# Patient Record
Sex: FEMALE | Race: WHITE | URBAN - METROPOLITAN AREA
[De-identification: names, ages, dates, MRNs, and addresses within clinical notes are randomized per-mention and may not be internally consistent; named-entity substitution may affect disease eponyms.]

---

## 2017-01-23 ENCOUNTER — MED REC SCAN ONLY (OUTPATIENT)
Dept: FAMILY MEDICINE CLINIC | Facility: CLINIC | Age: 77
End: 2017-01-23

## 2017-02-06 ENCOUNTER — OFFICE VISIT (OUTPATIENT)
Dept: FAMILY MEDICINE CLINIC | Facility: CLINIC | Age: 77
End: 2017-02-06

## 2017-02-06 VITALS
TEMPERATURE: 100 F | SYSTOLIC BLOOD PRESSURE: 130 MMHG | HEART RATE: 74 BPM | RESPIRATION RATE: 16 BRPM | DIASTOLIC BLOOD PRESSURE: 78 MMHG

## 2017-02-06 DIAGNOSIS — H25.013 CORTICAL AGE-RELATED CATARACT OF BOTH EYES: Primary | ICD-10-CM

## 2017-02-06 DIAGNOSIS — J44.9 CHRONIC OBSTRUCTIVE PULMONARY DISEASE, UNSPECIFIED COPD TYPE (HCC): ICD-10-CM

## 2017-02-06 DIAGNOSIS — Z01.818 PREOPERATIVE EXAMINATION: ICD-10-CM

## 2017-02-06 PROBLEM — E66.9 OBESITY: Status: ACTIVE | Noted: 2017-02-06

## 2017-02-06 PROBLEM — F17.200 TOBACCO USE DISORDER: Status: ACTIVE | Noted: 2017-02-06

## 2017-02-06 PROCEDURE — 93000 ELECTROCARDIOGRAM COMPLETE: CPT | Performed by: FAMILY MEDICINE

## 2017-02-06 PROCEDURE — 99215 OFFICE O/P EST HI 40 MIN: CPT | Performed by: FAMILY MEDICINE

## 2017-02-06 RX ORDER — LEVOTHYROXINE SODIUM 0.03 MG/1
1 TABLET ORAL
COMMUNITY
Start: 2015-10-21 | End: 2017-04-24

## 2017-02-06 RX ORDER — BUDESONIDE AND FORMOTEROL FUMARATE DIHYDRATE 160; 4.5 UG/1; UG/1
2 AEROSOL RESPIRATORY (INHALATION) DAILY
COMMUNITY
Start: 2012-12-19 | End: 2017-10-23

## 2017-02-06 RX ORDER — ALBUTEROL SULFATE 90 UG/1
2 AEROSOL, METERED RESPIRATORY (INHALATION) 4 TIMES DAILY PRN
COMMUNITY
Start: 2014-09-16 | End: 2017-07-13

## 2017-02-06 RX ORDER — LISINOPRIL 10 MG/1
1 TABLET ORAL
COMMUNITY
Start: 2014-05-15 | End: 2017-07-21

## 2017-02-06 NOTE — H&P
[facility]  [facility]    HPI:   Eze Davies is a 68year old female with a hx of cataract, who presents for a pre-operative physical exam. Patient is to cataract surgerye , to by done by Dr. Dudley Stoddard at [facility] on 2/13/20 Throat:  Denies hearing loss,or disturbance. Denies sore throat  INTEGUMENTARY:  Denies rashes, itching.     CARDIOVASCULAR:   Denies chest pain, chest pressure, chest discomfort, palpitations, edema, dyspnea on exertion or at rest.  RESPIRATORY:  See hpi, deficit, normal gait, strength and tone, sensory intact, normal reflexes. PSYCH: normal     ASSESSMENT AND PLAN:   Lucille Day is a 68year old female, with a hx of cataract surgery    Medically stable and clear for surgery.               GURPREET LIZAMA

## 2017-04-24 RX ORDER — LEVOTHYROXINE SODIUM 0.03 MG/1
TABLET ORAL
Qty: 30 TABLET | Refills: 6 | Status: SHIPPED | OUTPATIENT
Start: 2017-04-24 | End: 2017-10-27

## 2017-07-21 RX ORDER — LISINOPRIL 10 MG/1
TABLET ORAL
Qty: 30 TABLET | Refills: 5 | Status: SHIPPED | OUTPATIENT
Start: 2017-07-21 | End: 2018-03-06

## 2017-10-23 ENCOUNTER — TELEPHONE (OUTPATIENT)
Dept: FAMILY MEDICINE CLINIC | Facility: CLINIC | Age: 77
End: 2017-10-23

## 2017-10-23 RX ORDER — INFLUENZA A VIRUSA/MICHIGAN/45/2015 X-275 (H1N1) ANTIGEN (FORMALDEHYDE INACTIVATED), INFLUENZA A VIRUS A/HONG KONG/4801/2014 X-263B (H3N2) ANTIGEN (FORMALDEHYDE INACTIVATED), AND INFLUENZA B VIRUS B/BRISBANE/60/2008 ANTIGEN (FORMALDEHYDE INACTIVATED) 60; 60; 60 UG/.5ML; UG/.5ML; UG/.5ML
INJECTION, SUSPENSION INTRAMUSCULAR
Refills: 0 | COMMUNITY
Start: 2017-09-11 | End: 2018-03-20 | Stop reason: ALTCHOICE

## 2017-10-23 RX ORDER — BUDESONIDE AND FORMOTEROL FUMARATE DIHYDRATE 160; 4.5 UG/1; UG/1
2 AEROSOL RESPIRATORY (INHALATION) DAILY
Qty: 1 INHALER | Refills: 0 | Status: SHIPPED | OUTPATIENT
Start: 2017-10-23 | End: 2018-01-11

## 2017-10-26 ENCOUNTER — TELEPHONE (OUTPATIENT)
Dept: FAMILY MEDICINE CLINIC | Facility: CLINIC | Age: 77
End: 2017-10-26

## 2017-10-26 DIAGNOSIS — Z23 NEED FOR VACCINATION: Primary | ICD-10-CM

## 2017-10-26 NOTE — TELEPHONE ENCOUNTER
Looks like pt had prevnar 13 in 2016. Pt can get Pneumovax 23 now as long as she did not go elsewhere to get this shot. Then pt should be up to date on all pneumonia vaccinations for her lifetime.

## 2017-10-26 NOTE — TELEPHONE ENCOUNTER
Appt made  Future Appointments  Date Time Provider Edel Glover   10/27/2017 10:00 AM [facility] NURSE [facility] [place]

## 2017-10-26 NOTE — TELEPHONE ENCOUNTER
Patient notified of the below recommendations expressed understanding and thanks. Please enter orders?

## 2017-10-27 ENCOUNTER — NURSE ONLY (OUTPATIENT)
Dept: FAMILY MEDICINE CLINIC | Facility: CLINIC | Age: 77
End: 2017-10-27

## 2017-10-27 DIAGNOSIS — Z23 NEED FOR VACCINATION: ICD-10-CM

## 2017-10-27 PROCEDURE — G0009 ADMIN PNEUMOCOCCAL VACCINE: HCPCS | Performed by: FAMILY MEDICINE

## 2017-10-27 PROCEDURE — 90732 PPSV23 VACC 2 YRS+ SUBQ/IM: CPT | Performed by: FAMILY MEDICINE

## 2017-10-27 RX ORDER — LEVOTHYROXINE SODIUM 0.03 MG/1
TABLET ORAL
Qty: 30 TABLET | Refills: 6 | Status: SHIPPED | OUTPATIENT
Start: 2017-10-27 | End: 2018-03-05

## 2017-10-27 NOTE — TELEPHONE ENCOUNTER
Future appt:     Your appointments     Date & Time Appointment Department [facility])    Oct 27, 2017 10:00 AM CDT [facility]        [place] [address]

## 2017-12-18 RX ORDER — LISINOPRIL 10 MG/1
TABLET ORAL
Qty: 30 TABLET | Refills: 3 | OUTPATIENT
Start: 2017-12-18

## 2017-12-18 NOTE — TELEPHONE ENCOUNTER
Future appt:    Last Appointment:  Visit date not found  No results found for: CHOLEST, HDL, LDL, TRIGLY, TRIG  No results found for: EAG, A1C  No results found for: T4F, TSH, TSHT4    No Follow-up on file. No future appointments.       Last ov with pcp

## 2018-01-09 RX ORDER — BUDESONIDE AND FORMOTEROL FUMARATE DIHYDRATE 160; 4.5 UG/1; UG/1
2 AEROSOL RESPIRATORY (INHALATION) DAILY
Qty: 10.2 INHALER | Refills: 0 | Status: CANCELLED | OUTPATIENT
Start: 2018-01-09

## 2018-01-09 NOTE — TELEPHONE ENCOUNTER
Future appt:  None  Last Appointment:  2/6/2017; No f/u recommended    No results found for: CHOLEST, HDL, LDL, TRIGLY, TRIG  No results found for: EAG, A1C  No results found for: T4F, TSH, TSHT4    Last Labs:  6/1/2016  Last RF:  7/17/2017    No Follow-up

## 2018-01-09 NOTE — TELEPHONE ENCOUNTER
Future appt:  None  Last Appointment:  2/6/2017; No f/u recommended    No results found for: CHOLEST, HDL, LDL, TRIGLY, TRIG  No results found for: EAG, A1C  No results found for: T4F, TSH, TSHT4    Last Labs:  6/1/2016  Last RF:  10/23/2017    No Follow-u

## 2018-01-11 ENCOUNTER — TELEPHONE (OUTPATIENT)
Dept: FAMILY MEDICINE CLINIC | Facility: CLINIC | Age: 78
End: 2018-01-11

## 2018-01-11 RX ORDER — BUDESONIDE AND FORMOTEROL FUMARATE DIHYDRATE 160; 4.5 UG/1; UG/1
2 AEROSOL RESPIRATORY (INHALATION) DAILY
Qty: 1 INHALER | Refills: 1 | Status: SHIPPED | OUTPATIENT
Start: 2018-01-11 | End: 2018-02-07

## 2018-01-11 NOTE — TELEPHONE ENCOUNTER
Future appt:    Last Appointment:  2/6/17 with Dr. Dorie West  No results found for: CHOLEST, HDL, LDL, TRIGLY, TRIG  No results found for: EAG, A1C  No results found for: T4F, TSH, TSHT4  No results found for: GLU, BUN, BUNCREA, CREATSERUM, ANIONGAP, GFR,

## 2018-01-11 NOTE — TELEPHONE ENCOUNTER
See other phone note dated today  Patient stopped in to the office and a refill note was started    Yasmine Knight, 01/11/18, 11:45 AM

## 2018-02-05 NOTE — TELEPHONE ENCOUNTER
Future appt:     Your appointments     Date & Time Appointment Department [facility])    Feb 07, 2018 11:00 AM CST MA Supervisit with Claire Nick MD 25 [facility], Charissa Horton ([place])        [place] [address],

## 2018-02-07 ENCOUNTER — TELEPHONE (OUTPATIENT)
Dept: FAMILY MEDICINE CLINIC | Facility: CLINIC | Age: 78
End: 2018-02-07

## 2018-02-07 ENCOUNTER — OFFICE VISIT (OUTPATIENT)
Dept: FAMILY MEDICINE CLINIC | Facility: CLINIC | Age: 78
End: 2018-02-07

## 2018-02-07 ENCOUNTER — APPOINTMENT (OUTPATIENT)
Dept: LAB | Age: 78
End: 2018-02-07
Attending: FAMILY MEDICINE
Payer: MEDICARE

## 2018-02-07 VITALS
TEMPERATURE: 99 F | DIASTOLIC BLOOD PRESSURE: 60 MMHG | OXYGEN SATURATION: 92 % | BODY MASS INDEX: 26.25 KG/M2 | WEIGHT: 150 LBS | RESPIRATION RATE: 16 BRPM | SYSTOLIC BLOOD PRESSURE: 130 MMHG | HEIGHT: 63.5 IN | HEART RATE: 64 BPM

## 2018-02-07 DIAGNOSIS — J43.1 PANLOBULAR EMPHYSEMA (HCC): ICD-10-CM

## 2018-02-07 DIAGNOSIS — E03.9 ACQUIRED HYPOTHYROIDISM: ICD-10-CM

## 2018-02-07 DIAGNOSIS — Z00.00 ENCOUNTER FOR ANNUAL HEALTH EXAMINATION: ICD-10-CM

## 2018-02-07 DIAGNOSIS — Z00.00 HEALTH CARE MAINTENANCE: Primary | ICD-10-CM

## 2018-02-07 DIAGNOSIS — I10 BENIGN ESSENTIAL HYPERTENSION: ICD-10-CM

## 2018-02-07 DIAGNOSIS — I10 ESSENTIAL HYPERTENSION: ICD-10-CM

## 2018-02-07 DIAGNOSIS — F17.200 TOBACCO USE DISORDER: ICD-10-CM

## 2018-02-07 LAB
ALBUMIN SERPL-MCNC: 3.9 G/DL (ref 3.5–4.8)
ALP LIVER SERPL-CCNC: 61 U/L (ref 55–142)
ALT SERPL-CCNC: 12 U/L (ref 14–54)
AST SERPL-CCNC: 16 U/L (ref 15–41)
BILIRUB SERPL-MCNC: 0.6 MG/DL (ref 0.1–2)
BUN BLD-MCNC: 14 MG/DL (ref 8–20)
CALCIUM BLD-MCNC: 9.6 MG/DL (ref 8.3–10.3)
CHLORIDE: 107 MMOL/L (ref 101–111)
CO2: 28 MMOL/L (ref 22–32)
CREAT BLD-MCNC: 0.92 MG/DL (ref 0.55–1.02)
GLUCOSE BLD-MCNC: 79 MG/DL (ref 70–99)
M PROTEIN MFR SERPL ELPH: 7.2 G/DL (ref 6.1–8.3)
POTASSIUM SERPL-SCNC: 4.3 MMOL/L (ref 3.6–5.1)
SODIUM SERPL-SCNC: 143 MMOL/L (ref 136–144)
TSI SER-ACNC: 2.65 MIU/ML (ref 0.35–5.5)

## 2018-02-07 PROCEDURE — 36415 COLL VENOUS BLD VENIPUNCTURE: CPT | Performed by: FAMILY MEDICINE

## 2018-02-07 PROCEDURE — G0439 PPPS, SUBSEQ VISIT: HCPCS | Performed by: FAMILY MEDICINE

## 2018-02-07 PROCEDURE — 80053 COMPREHEN METABOLIC PANEL: CPT | Performed by: FAMILY MEDICINE

## 2018-02-07 PROCEDURE — 84443 ASSAY THYROID STIM HORMONE: CPT | Performed by: FAMILY MEDICINE

## 2018-02-07 PROCEDURE — 99397 PER PM REEVAL EST PAT 65+ YR: CPT | Performed by: FAMILY MEDICINE

## 2018-02-07 PROCEDURE — 96160 PT-FOCUSED HLTH RISK ASSMT: CPT | Performed by: FAMILY MEDICINE

## 2018-02-07 RX ORDER — ALBUTEROL SULFATE 90 UG/1
AEROSOL, METERED RESPIRATORY (INHALATION)
Qty: 3 INHALER | Refills: 3 | Status: SHIPPED | OUTPATIENT
Start: 2018-02-07 | End: 2018-11-01

## 2018-02-07 RX ORDER — BUDESONIDE AND FORMOTEROL FUMARATE DIHYDRATE 160; 4.5 UG/1; UG/1
2 AEROSOL RESPIRATORY (INHALATION) DAILY
Qty: 1 INHALER | Refills: 3 | Status: SHIPPED | OUTPATIENT
Start: 2018-02-07 | End: 2018-10-08

## 2018-02-07 NOTE — PROGRESS NOTES
Chief Complaint:   Patient presents with:  Physical      HPI:   This is a 68year old female coming in for healthcare check including underlying chronic illnesses which include COPD and hypertension.     COPD: Patient feels this is very stable recently she MOUTH EVERY MORNING Disp: 30 tablet Rfl: 5   Calcium Carbonate-Vitamin D (CALCIUM 500/VITAMIN D) 500-125 MG-UNIT Oral Tab Take 1 tablet by mouth daily.  Disp:  Rfl:    Tiotropium Bromide Monohydrate (SPIRIVA HANDIHALER) 18 MCG Inhalation Cap Inhale 1 capsul good dentition. NECK: Supple,  no JVD, no thyromegaly. SKIN: No rashes, no skin lesion, no bruising, good turgor. BREAST : deferred     HEART:  Regular rate and rhythm, no murmurs,   LUNGS: Clear to auscultation bilterally, no rales/rhonchi/wheezing. to call with any side effects or complications from the treatments as a result of today.      Problem List:  Patient Active Problem List:     [place] lesion     Panlobular emphysema ([facility] [address]     Essential hypertension     Hypoxemia     Obesity     Tobacco use dis

## 2018-02-08 NOTE — TELEPHONE ENCOUNTER
----- Message from Ciera Gaitan MD sent at 2/7/2018  6:17 PM CST -----  Labs reviewed   Thyroid testing good - continue with current medication dosing     BS, kidney fjunction and LFTs - normal

## 2018-02-28 ENCOUNTER — TELEPHONE (OUTPATIENT)
Dept: FAMILY MEDICINE CLINIC | Facility: CLINIC | Age: 78
End: 2018-02-28

## 2018-02-28 NOTE — TELEPHONE ENCOUNTER
Spoke w/ patient and informed her that inhalers were sent to pharmacy on 2/7/18. Pt states she was waiting for call back from us to let her know that they were filled. No further questions.

## 2018-02-28 NOTE — TELEPHONE ENCOUNTER
Called a couple weeks ago requesting Proair and Symbicort. Has not heard back. Please call her back today.

## 2018-03-05 RX ORDER — LEVOTHYROXINE SODIUM 0.03 MG/1
TABLET ORAL
Qty: 30 TABLET | Refills: 6 | Status: SHIPPED | OUTPATIENT
Start: 2018-03-05 | End: 2018-03-20 | Stop reason: DRUGHIGH

## 2018-03-05 NOTE — TELEPHONE ENCOUNTER
Levothyroxine 25 mcg daily  Last RF 10-27-17    Future appt:    Last Appointment:  2/7/2018 (physical - recheck in 1 year)  No results found for: CHOLEST, HDL, LDL, TRIGLY, TRIG  No results found for: EAG, A1C    Lab Results  Component Value Date   TSH 2.6

## 2018-03-06 RX ORDER — LISINOPRIL 10 MG/1
TABLET ORAL
Qty: 30 TABLET | Refills: 5 | Status: SHIPPED | OUTPATIENT
Start: 2018-03-06 | End: 2018-03-20

## 2018-03-06 NOTE — TELEPHONE ENCOUNTER
Lisinopril 10 mg tab daily  Last RF 7/21/17    Future appt:    Last Appointment:  2/7/2018 (physical - recheck in 1 year)    No results found for: CHOLEST, HDL, LDL, TRIGLY, TRIG  No results found for: EAG, A1C    Lab Results  Component Value Date   TSH 2.650 02/07/2018       No Follow-up on file.

## 2018-03-20 ENCOUNTER — OFFICE VISIT (OUTPATIENT)
Dept: FAMILY MEDICINE CLINIC | Facility: CLINIC | Age: 78
End: 2018-03-20

## 2018-03-20 VITALS
TEMPERATURE: 98 F | OXYGEN SATURATION: 91 % | WEIGHT: 149 LBS | DIASTOLIC BLOOD PRESSURE: 70 MMHG | RESPIRATION RATE: 18 BRPM | SYSTOLIC BLOOD PRESSURE: 146 MMHG | HEIGHT: 63.5 IN | BODY MASS INDEX: 26.08 KG/M2 | HEART RATE: 74 BPM

## 2018-03-20 DIAGNOSIS — R04.0 EPISTAXIS, RECURRENT: ICD-10-CM

## 2018-03-20 DIAGNOSIS — I16.0 HYPERTENSIVE URGENCY: Primary | ICD-10-CM

## 2018-03-20 PROCEDURE — 99214 OFFICE O/P EST MOD 30 MIN: CPT | Performed by: FAMILY MEDICINE

## 2018-03-20 PROCEDURE — 1111F DSCHRG MED/CURRENT MED MERGE: CPT | Performed by: FAMILY MEDICINE

## 2018-03-20 RX ORDER — LISINOPRIL 20 MG/1
20 TABLET ORAL DAILY
Qty: 90 TABLET | Refills: 3 | Status: SHIPPED | OUTPATIENT
Start: 2018-03-20 | End: 2018-08-31

## 2018-03-20 RX ORDER — LEVOTHYROXINE SODIUM 175 UG/1
1 TABLET ORAL DAILY
COMMUNITY
End: 2018-04-02

## 2018-03-20 NOTE — PROGRESS NOTES
Chief Complaint:   Patient presents with:  Hospital F/U: Bloody noses      HPI:   This is a 68year old female coming in for f/u of hypertensive urgency. Having nose bleeds. - seen in ED . No further nose bleeds. No headaches.        Brought readings edema,  RESPIRATORY:  Denies shortness of breath, wheezing, cough or sputum production. GASTROINTESTINAL:  Denies abdominal pain, nausea, vomiting, constipation, diarrhea  : denies dysuria, no urinary frequency , no discharge.     MUSCULOSKELETAL:  Deepa Porter noted.    ASSESSMENT AND PLAN:   1. Hypertensive urgency      2. Epistaxis, recurrent          Meds & Refills for this Visit:  Signed Prescriptions Disp Refills    lisinopril 20 MG Oral Tab 90 tablet 3      Sig: Take 1 tablet (20 mg total) by mouth daily.

## 2018-04-02 ENCOUNTER — OFFICE VISIT (OUTPATIENT)
Dept: FAMILY MEDICINE CLINIC | Facility: CLINIC | Age: 78
End: 2018-04-02

## 2018-04-02 ENCOUNTER — LAB ENCOUNTER (OUTPATIENT)
Dept: LAB | Age: 78
End: 2018-04-02
Attending: NURSE PRACTITIONER
Payer: MEDICARE

## 2018-04-02 VITALS
HEART RATE: 76 BPM | BODY MASS INDEX: 26 KG/M2 | RESPIRATION RATE: 14 BRPM | TEMPERATURE: 99 F | WEIGHT: 150 LBS | SYSTOLIC BLOOD PRESSURE: 112 MMHG | DIASTOLIC BLOOD PRESSURE: 78 MMHG

## 2018-04-02 DIAGNOSIS — N93.9 ABNORMAL VAGINAL BLEEDING: Primary | ICD-10-CM

## 2018-04-02 DIAGNOSIS — N93.9 ABNORMAL VAGINAL BLEEDING: ICD-10-CM

## 2018-04-02 PROCEDURE — 85025 COMPLETE CBC W/AUTO DIFF WBC: CPT | Performed by: NURSE PRACTITIONER

## 2018-04-02 PROCEDURE — 83002 ASSAY OF GONADOTROPIN (LH): CPT | Performed by: NURSE PRACTITIONER

## 2018-04-02 PROCEDURE — 36415 COLL VENOUS BLD VENIPUNCTURE: CPT | Performed by: NURSE PRACTITIONER

## 2018-04-02 PROCEDURE — 83001 ASSAY OF GONADOTROPIN (FSH): CPT | Performed by: NURSE PRACTITIONER

## 2018-04-02 PROCEDURE — 82671 ASSAY OF ESTROGENS: CPT | Performed by: NURSE PRACTITIONER

## 2018-04-02 PROCEDURE — 80053 COMPREHEN METABOLIC PANEL: CPT | Performed by: NURSE PRACTITIONER

## 2018-04-02 PROCEDURE — 84144 ASSAY OF PROGESTERONE: CPT | Performed by: NURSE PRACTITIONER

## 2018-04-02 PROCEDURE — 99214 OFFICE O/P EST MOD 30 MIN: CPT | Performed by: NURSE PRACTITIONER

## 2018-04-02 RX ORDER — LEVOTHYROXINE SODIUM 0.03 MG/1
1 TABLET ORAL DAILY
COMMUNITY
Start: 2018-03-26 | End: 2018-11-30

## 2018-04-02 NOTE — PATIENT INSTRUCTIONS
Blood work today  Transvaginal and transabdominal ultrasound ordered, please schedule here at this site either Tuesday or Thursday. Return with PCP if symptoms worsen.

## 2018-04-02 NOTE — PROGRESS NOTES
[address] NOTE  Jayne Lau is a 68year old female. Chief Complaint:  Patient presents with:   Other: rectal bleeding noticed this morning    HPI:   Patient presents to office visit with complaint of nickel-sized brigh History:  Family History   Problem Relation Age of Onset   • Diabetes Sister      Allergies:  No Known Allergies  Current Meds:    Current Outpatient Prescriptions:  Levothyroxine Sodium 25 MCG Oral Tab Take 1 tablet by mouth daily.  Disp:  Rfl:    lisinopr tenderness, no G/R  GYNE/: normal external genitalia; no vaginal discharge, mild red blood coming from os of cervix, no polyps or lesions in vaginal vault  RECTAL: no rectal masses; stool heme negative  EXTREMITIES: no cyanosis, clubbing or edema  Psych:

## 2018-04-03 ENCOUNTER — TELEPHONE (OUTPATIENT)
Dept: FAMILY MEDICINE CLINIC | Facility: CLINIC | Age: 78
End: 2018-04-03

## 2018-04-03 NOTE — TELEPHONE ENCOUNTER
Patient notified per [place] Course, APN  When patient has ultrasound appt needs to come in with full bladder. Patient expressed understanding and thanks.

## 2018-04-03 NOTE — TELEPHONE ENCOUNTER
Per request from ultrasound, remind patient to come in with a full bladder for pelvis ultrasound today.

## 2018-04-07 ENCOUNTER — TELEPHONE (OUTPATIENT)
Dept: FAMILY MEDICINE CLINIC | Facility: CLINIC | Age: 78
End: 2018-04-07

## 2018-04-07 NOTE — TELEPHONE ENCOUNTER
----- Message from BAKARI Wood sent at 4/7/2018  7:42 AM CDT -----  Patient was seen by ABBY Messina. Please let her know that her estrogen levels are normal. Thank you.  ABBY Wood, FNP-BC  4/7/2018  7:42 AM

## 2018-04-10 ENCOUNTER — HOSPITAL ENCOUNTER (OUTPATIENT)
Dept: ULTRASOUND IMAGING | Age: 78
Discharge: HOME | End: 2018-04-10
Attending: NURSE PRACTITIONER
Payer: MEDICARE

## 2018-04-10 DIAGNOSIS — N93.9 ABNORMAL VAGINAL BLEEDING: ICD-10-CM

## 2018-04-10 PROCEDURE — 76830 TRANSVAGINAL US NON-OB: CPT | Performed by: NURSE PRACTITIONER

## 2018-04-10 PROCEDURE — 76856 US EXAM PELVIC COMPLETE: CPT | Performed by: NURSE PRACTITIONER

## 2018-04-13 ENCOUNTER — OFFICE VISIT (OUTPATIENT)
Dept: FAMILY MEDICINE CLINIC | Facility: CLINIC | Age: 78
End: 2018-04-13

## 2018-04-13 VITALS
TEMPERATURE: 98 F | RESPIRATION RATE: 18 BRPM | HEART RATE: 72 BPM | HEIGHT: 63.5 IN | OXYGEN SATURATION: 91 % | DIASTOLIC BLOOD PRESSURE: 74 MMHG | SYSTOLIC BLOOD PRESSURE: 140 MMHG | WEIGHT: 152.38 LBS | BODY MASS INDEX: 26.67 KG/M2

## 2018-04-13 DIAGNOSIS — N84.0 UTERINE POLYP: ICD-10-CM

## 2018-04-13 DIAGNOSIS — N93.8 DYSFUNCTIONAL UTERINE BLEEDING: Primary | ICD-10-CM

## 2018-04-13 PROCEDURE — 99215 OFFICE O/P EST HI 40 MIN: CPT | Performed by: FAMILY MEDICINE

## 2018-04-13 RX ORDER — MEDROXYPROGESTERONE ACETATE 10 MG/1
10 TABLET ORAL DAILY
Qty: 10 TABLET | Refills: 0 | Status: SHIPPED | OUTPATIENT
Start: 2018-04-13 | End: 2018-04-23

## 2018-04-13 NOTE — PROGRESS NOTES
Chief Complaint:   Patient presents with: Follow - Up: 6 week f/u      HPI:   This is a 68year old female coming in for follow-up care regarding vaginal bleeding regard patient was followed up with the prior evaluation with Deysi Mcfarland.   Had an ultrasound %   RDW-SD 48.3 (H) 35.1 - 46.3 fL   Neutrophil Absolute Prelim 5.30 1.30 - 6.70 x10 (3) uL   Neutrophil Absolute 5.30 1.30 - 6.70 x10(3) uL   Lymphocyte Absolute 1.15 0.90 - 4.00 x10(3) uL   Monocyte Absolute 0.51 0.10 - 1.00 x10(3) uL   Eosinophil Absolu daily. Disp:  Rfl:         Allergies:  No Known Allergies       REVIEW OF SYSTEMS:   CONSTITUTIONAL:  Denies , fever, chills, or fatigue,     EENT:  Eyes:  Denies eye pain, visual changes,   Ears, Nose, Throat:  Denies hearing loss,or disturbance.  Denies s pulses bilaterally. NEURO:  No deficit, normal gait, strength and tone, sensory intact,      PSYCH: no depression or anxiety noted. ASSESSMENT AND PLAN:   1. Dysfunctional uterine bleeding  2.  Uterine polyp  Discussed with patient potential need for re

## 2018-04-13 NOTE — PATIENT INSTRUCTIONS
Start medication daily for 10 days. NOTE:  May have bleeding after medicine stops. Recheck with me in 4 -6 weeks.

## 2018-05-18 ENCOUNTER — TELEPHONE (OUTPATIENT)
Dept: FAMILY MEDICINE CLINIC | Facility: CLINIC | Age: 78
End: 2018-05-18

## 2018-05-18 NOTE — TELEPHONE ENCOUNTER
Jarred Corona came in for her appointment today. Her appointment was at 10am but she showed up @ 10:30am. She said that Dao said her appointment was at 10:30. She was told that she needs to reschedule because she was late.     Per Ewelina to no show her but

## 2018-05-21 ENCOUNTER — OFFICE VISIT (OUTPATIENT)
Dept: FAMILY MEDICINE CLINIC | Facility: CLINIC | Age: 78
End: 2018-05-21

## 2018-05-21 VITALS
SYSTOLIC BLOOD PRESSURE: 152 MMHG | HEIGHT: 63.5 IN | BODY MASS INDEX: 26.63 KG/M2 | RESPIRATION RATE: 16 BRPM | HEART RATE: 62 BPM | WEIGHT: 152.19 LBS | TEMPERATURE: 98 F | DIASTOLIC BLOOD PRESSURE: 78 MMHG

## 2018-05-21 DIAGNOSIS — I10 ESSENTIAL HYPERTENSION: ICD-10-CM

## 2018-05-21 DIAGNOSIS — N84.0 UTERINE POLYP: Primary | ICD-10-CM

## 2018-05-21 DIAGNOSIS — N93.9 ABNORMAL VAGINAL BLEEDING: ICD-10-CM

## 2018-05-21 DIAGNOSIS — J43.1 PANLOBULAR EMPHYSEMA (HCC): ICD-10-CM

## 2018-05-21 PROCEDURE — 99214 OFFICE O/P EST MOD 30 MIN: CPT | Performed by: FAMILY MEDICINE

## 2018-05-21 NOTE — PROGRESS NOTES
Chief Complaint:   Patient presents with: Follow - Up: spotting      HPI:   This is a 68year old female coming in for f/u of utrerine polyp and spotting. Patient with a lot of stresses recently with sister in hospice. Sister now doing better.      No furt Neutrophil % 74.3 %   Lymphocyte % 16.1 %   Monocyte % 7.1 %   Eosinophil % 1.8 %   Basophil % 0.4 %   Immature Granulocyte % 0.3 %         Past Medical History (reviewed - no changes required):   Smoker - advised to quit  COPD/Emphysema - sees dr. Juanjose Lai nausea, vomiting, constipation, diarrhea  :see HPI    denies dysuria, no urinary frequency , no discharge.     MUSCULOSKELETAL:  Denies weakness, muscle aches,   NEUROLOGICAL:  Denies headache, dizziness,   HEMATOLOGIC:  Denies bleeding or bruising.    Education: Patient/Caregiver Education: There are no barriers to learning. Medical education done. Outcome: Patient verbalizes understanding. Patient is notified to call with any questions, complications, allergies, or worsening or changing symptoms.   Pa

## 2018-08-31 ENCOUNTER — OFFICE VISIT (OUTPATIENT)
Dept: FAMILY MEDICINE CLINIC | Facility: CLINIC | Age: 78
End: 2018-08-31
Payer: COMMERCIAL

## 2018-08-31 VITALS
RESPIRATION RATE: 20 BRPM | WEIGHT: 152 LBS | DIASTOLIC BLOOD PRESSURE: 68 MMHG | SYSTOLIC BLOOD PRESSURE: 172 MMHG | HEART RATE: 76 BPM | BODY MASS INDEX: 27 KG/M2 | TEMPERATURE: 97 F

## 2018-08-31 DIAGNOSIS — F17.200 TOBACCO USE DISORDER: ICD-10-CM

## 2018-08-31 DIAGNOSIS — I10 ESSENTIAL HYPERTENSION: Primary | ICD-10-CM

## 2018-08-31 PROCEDURE — 99214 OFFICE O/P EST MOD 30 MIN: CPT | Performed by: FAMILY MEDICINE

## 2018-08-31 RX ORDER — LISINOPRIL 40 MG/1
40 TABLET ORAL DAILY
Qty: 90 TABLET | Refills: 3 | Status: SHIPPED | OUTPATIENT
Start: 2018-08-31 | End: 2019-08-15

## 2018-08-31 NOTE — PATIENT INSTRUCTIONS
Increase dose of lisinopril to 40 mg daily     Recheck in 3 months.      Low salt diet ; drink plenty of water

## 2018-08-31 NOTE — PROGRESS NOTES
Chief Complaint:   Patient presents with:  Vaginal Bleeding: 3 month follow up      HPI:   This is a 68year old female coming in for hypertension follow-up. Patient has noticed some increase in blood pressures recently.   She notes a lot of that she feels  Eyes:  Denies eye pain, visual changes,   Ears, Nose, Throat:  Denies hearing loss,or disturbance.  Denies sore throat  INTEGUMENTARY:  Denies rashes, itching.     CARDIOVASCULAR: Denies  chest discomfort, palpitations, edema,  RESPIRATORY:  Denies shortne hypertension      2. Tobacco use disorder          Meds & Refills for this Visit:  Signed Prescriptions Disp Refills    lisinopril 40 MG Oral Tab 90 tablet 3      Sig: Take 1 tablet (40 mg total) by mouth daily.            Patient Instructions   Increase do

## 2018-09-17 RX ORDER — LEVOTHYROXINE SODIUM 0.03 MG/1
TABLET ORAL
Qty: 30 TABLET | Refills: 1 | Status: SHIPPED | OUTPATIENT
Start: 2018-09-17 | End: 2018-10-06

## 2018-09-17 NOTE — TELEPHONE ENCOUNTER
Future appt:     Your appointments     Date & Time Appointment Department [facility])    Nov 30, 2018 10:00 AM CST Follow up - Extended with Jenaro Jacobsen MD 25 [facility], [place] ([facility])        [facility]

## 2018-10-06 ENCOUNTER — OFFICE VISIT (OUTPATIENT)
Dept: FAMILY MEDICINE CLINIC | Facility: CLINIC | Age: 78
End: 2018-10-06
Payer: COMMERCIAL

## 2018-10-06 VITALS
OXYGEN SATURATION: 93 % | SYSTOLIC BLOOD PRESSURE: 138 MMHG | HEART RATE: 70 BPM | DIASTOLIC BLOOD PRESSURE: 80 MMHG | RESPIRATION RATE: 18 BRPM | TEMPERATURE: 98 F | HEIGHT: 63.5 IN | WEIGHT: 150.19 LBS | BODY MASS INDEX: 26.28 KG/M2

## 2018-10-06 DIAGNOSIS — I10 ESSENTIAL HYPERTENSION: Primary | ICD-10-CM

## 2018-10-06 DIAGNOSIS — J43.1 PANLOBULAR EMPHYSEMA (HCC): ICD-10-CM

## 2018-10-06 PROCEDURE — 99214 OFFICE O/P EST MOD 30 MIN: CPT | Performed by: FAMILY MEDICINE

## 2018-10-06 RX ORDER — HYDROCHLOROTHIAZIDE 25 MG/1
25 TABLET ORAL DAILY
Qty: 90 TABLET | Refills: 3 | Status: SHIPPED | OUTPATIENT
Start: 2018-10-06 | End: 2019-09-17

## 2018-10-06 NOTE — PATIENT INSTRUCTIONS
Start new blood pressure medication - HCTZ - take once daily with lisinopril    Continue with taking lisinopril     Continue with taking blood pressure readings. Recheck appointment with me in 6 weeks.

## 2018-10-06 NOTE — PROGRESS NOTES
Chief Complaint:   Patient presents with:  Medication Follow-Up: Patient would like to talk about increasing blood pressure medication      HPI:   This is a 66year old female coming in for follow-up care regarding hypertension.   Patient's been checking he 5. 30 1.30 - 6.70 x10 (3) uL    Neutrophil Absolute 5.30 1.30 - 6.70 x10(3) uL    Lymphocyte Absolute 1.15 0.90 - 4.00 x10(3) uL    Monocyte Absolute 0.51 0.10 - 1.00 x10(3) uL    Eosinophil Absolute 0.13 0.00 - 0.30 x10(3) uL    Basophil Absolute 0.03 0.00 mouth daily. Disp:  Rfl:    Tiotropium Bromide Monohydrate (SPIRIVA HANDIHALER) 18 MCG Inhalation Cap Inhale 1 capsule into the lungs daily.  Disp:  Rfl:         Allergies:  No Known Allergies       REVIEW OF SYSTEMS:   CONSTITUTIONAL:  Denies , fever, chil hepatosplenomegaly.     BACK: No tenderness, no spasm, SLR test negative, FROM. EXTREMITIES:  No edema, no cyanosis,FROM, 2+ dorsalis pedis pulses bilaterally.   NEURO:  No deficit, normal gait, strength and tone, sensory intact,      PSYCH: no depression

## 2018-10-08 NOTE — TELEPHONE ENCOUNTER
Please advise refill of symbicort. Future appt:     Your appointments     Date & Time Appointment Department [facility])    Nov 30, 2018 10:00 AM CST Follow up - Extended with Sam Amezcua MD 25 [facility], [facility] ([facility]

## 2018-10-09 RX ORDER — BUDESONIDE AND FORMOTEROL FUMARATE DIHYDRATE 160; 4.5 UG/1; UG/1
AEROSOL RESPIRATORY (INHALATION)
Qty: 10.2 INHALER | Refills: 3 | Status: SHIPPED | OUTPATIENT
Start: 2018-10-09 | End: 2019-01-01

## 2018-11-01 RX ORDER — ALBUTEROL SULFATE 90 UG/1
AEROSOL, METERED RESPIRATORY (INHALATION)
Qty: 25.5 INHALER | Refills: 3 | Status: SHIPPED | OUTPATIENT
Start: 2018-11-01 | End: 2019-09-14

## 2018-11-01 NOTE — TELEPHONE ENCOUNTER
Please advise refill of Proair Inhaler. Last Rx: 2/7/18    Future appt:     Your appointments     Date & Time Appointment Department [facility])    Nov 30, 2018 10:00 AM CST Follow up - Extended with Jael Reynolds MD [address] [place] [address]

## 2018-11-30 ENCOUNTER — OFFICE VISIT (OUTPATIENT)
Dept: FAMILY MEDICINE CLINIC | Facility: CLINIC | Age: 78
End: 2018-11-30
Payer: COMMERCIAL

## 2018-11-30 VITALS
DIASTOLIC BLOOD PRESSURE: 60 MMHG | HEIGHT: 63.5 IN | SYSTOLIC BLOOD PRESSURE: 126 MMHG | TEMPERATURE: 98 F | HEART RATE: 72 BPM | OXYGEN SATURATION: 90 % | BODY MASS INDEX: 25.83 KG/M2 | WEIGHT: 147.63 LBS | RESPIRATION RATE: 16 BRPM

## 2018-11-30 DIAGNOSIS — J43.1 PANLOBULAR EMPHYSEMA (HCC): ICD-10-CM

## 2018-11-30 DIAGNOSIS — I10 ESSENTIAL HYPERTENSION: Primary | ICD-10-CM

## 2018-11-30 PROCEDURE — 99214 OFFICE O/P EST MOD 30 MIN: CPT | Performed by: FAMILY MEDICINE

## 2018-11-30 RX ORDER — LEVOTHYROXINE SODIUM 0.03 MG/1
25 TABLET ORAL DAILY
Qty: 90 TABLET | Refills: 1 | Status: SHIPPED | OUTPATIENT
Start: 2018-11-30 | End: 2019-06-14

## 2018-11-30 RX ORDER — AMLODIPINE BESYLATE 2.5 MG/1
2.5 TABLET ORAL DAILY
Qty: 90 TABLET | Refills: 3 | Status: SHIPPED | OUTPATIENT
Start: 2018-11-30 | End: 2019-05-31

## 2018-11-30 NOTE — PROGRESS NOTES
Chief Complaint:   Patient presents with: Follow - Up: 3 month f/u      HPI:   This is a 66year old female coming in for follow-up care regarding hypertension. Patient takes her medication regularly.   She also checks her blood pressure her blood pressur 5. 30 1.30 - 6.70 x10 (3) uL    Neutrophil Absolute 5.30 1.30 - 6.70 x10(3) uL    Lymphocyte Absolute 1.15 0.90 - 4.00 x10(3) uL    Monocyte Absolute 0.51 0.10 - 1.00 x10(3) uL    Eosinophil Absolute 0.13 0.00 - 0.30 x10(3) uL    Basophil Absolute 0.03 0.00 lisinopril 40 MG Oral Tab Take 1 tablet (40 mg total) by mouth daily. Disp: 90 tablet Rfl: 3   Levothyroxine Sodium 25 MCG Oral Tab Take 1 tablet by mouth daily.  Disp:  Rfl:    Calcium Carbonate-Vitamin D (CALCIUM 500/VITAMIN D) 500-125 MG-UNIT Oral Tab thyromegaly.   SKIN: No rashes, no skin lesion, no bruising, good turgor.     HEART:  Regular rate and rhythm, no murmurs,   LUNGS: Clear to auscultation bilterally, no rales/rhonchi/wheezing.     ABDOMEN:  Soft, nondistended, nontender, bowel sounds normal Tobacco use disorder      Jose Alberto MD  11/30/2018  10:38 AM

## 2018-11-30 NOTE — PATIENT INSTRUCTIONS
Add amlodipine - take daily in the morning. Continue with lisinopril and HCTZ. Recheck in 3 months.  - physical .

## 2018-11-30 NOTE — TELEPHONE ENCOUNTER
Please advise refill of levothyroxine 25mcg. Future appt:     Your appointments     Date & Time Appointment Department [facility])    Mar 01, 2019 10:30 AM [facility] Supervisit with Billy Wetzel MD [address] [place] [address]

## 2019-01-01 RX ORDER — BUDESONIDE AND FORMOTEROL FUMARATE DIHYDRATE 160; 4.5 UG/1; UG/1
AEROSOL RESPIRATORY (INHALATION)
Qty: 20.4 INHALER | Refills: 1 | Status: SHIPPED | OUTPATIENT
Start: 2019-01-01 | End: 2020-01-01

## 2019-01-01 RX ORDER — LEVOTHYROXINE SODIUM 0.03 MG/1
TABLET ORAL
Qty: 90 TABLET | Refills: 0 | Status: SHIPPED | OUTPATIENT
Start: 2019-01-01 | End: 2020-01-01

## 2019-03-01 ENCOUNTER — LAB ENCOUNTER (OUTPATIENT)
Dept: LAB | Age: 79
End: 2019-03-01
Attending: FAMILY MEDICINE
Payer: MEDICARE

## 2019-03-01 ENCOUNTER — OFFICE VISIT (OUTPATIENT)
Dept: FAMILY MEDICINE CLINIC | Facility: CLINIC | Age: 79
End: 2019-03-01
Payer: COMMERCIAL

## 2019-03-01 VITALS
RESPIRATION RATE: 18 BRPM | HEART RATE: 70 BPM | HEIGHT: 63.5 IN | BODY MASS INDEX: 25.66 KG/M2 | DIASTOLIC BLOOD PRESSURE: 60 MMHG | OXYGEN SATURATION: 91 % | WEIGHT: 146.63 LBS | SYSTOLIC BLOOD PRESSURE: 124 MMHG | TEMPERATURE: 98 F

## 2019-03-01 DIAGNOSIS — I10 ESSENTIAL HYPERTENSION: ICD-10-CM

## 2019-03-01 DIAGNOSIS — Z00.00 ENCOUNTER FOR ANNUAL HEALTH EXAMINATION: Primary | ICD-10-CM

## 2019-03-01 DIAGNOSIS — J43.1 PANLOBULAR EMPHYSEMA (HCC): ICD-10-CM

## 2019-03-01 DIAGNOSIS — F17.200 TOBACCO USE DISORDER: ICD-10-CM

## 2019-03-01 LAB
ALBUMIN SERPL-MCNC: 3.6 G/DL (ref 3.4–5)
ALBUMIN/GLOB SERPL: 1 {RATIO} (ref 1–2)
ALP LIVER SERPL-CCNC: 50 U/L (ref 55–142)
ALT SERPL-CCNC: 17 U/L (ref 13–56)
ANION GAP SERPL CALC-SCNC: 7 MMOL/L (ref 0–18)
AST SERPL-CCNC: 19 U/L (ref 15–37)
BASOPHILS # BLD AUTO: 0.03 X10(3) UL (ref 0–0.2)
BASOPHILS NFR BLD AUTO: 0.5 %
BILIRUB SERPL-MCNC: 0.4 MG/DL (ref 0.1–2)
BILIRUB UR QL STRIP.AUTO: NEGATIVE
BUN BLD-MCNC: 13 MG/DL (ref 7–18)
BUN/CREAT SERPL: 12 (ref 10–20)
CALCIUM BLD-MCNC: 9.4 MG/DL (ref 8.5–10.1)
CHLORIDE SERPL-SCNC: 108 MMOL/L (ref 98–107)
CLARITY UR REFRACT.AUTO: CLEAR
CO2 SERPL-SCNC: 27 MMOL/L (ref 21–32)
COLOR UR AUTO: YELLOW
CREAT BLD-MCNC: 1.08 MG/DL (ref 0.55–1.02)
DEPRECATED RDW RBC AUTO: 45.8 FL (ref 35.1–46.3)
EOSINOPHIL # BLD AUTO: 0.2 X10(3) UL (ref 0–0.7)
EOSINOPHIL NFR BLD AUTO: 3.1 %
ERYTHROCYTE [DISTWIDTH] IN BLOOD BY AUTOMATED COUNT: 13.5 % (ref 11–15)
GLOBULIN PLAS-MCNC: 3.6 G/DL (ref 2.8–4.4)
GLUCOSE BLD-MCNC: 74 MG/DL (ref 70–99)
GLUCOSE UR STRIP.AUTO-MCNC: NEGATIVE MG/DL
HCT VFR BLD AUTO: 40 % (ref 35–48)
HGB BLD-MCNC: 12.9 G/DL (ref 12–16)
IMM GRANULOCYTES # BLD AUTO: 0.03 X10(3) UL (ref 0–1)
IMM GRANULOCYTES NFR BLD: 0.5 %
KETONES UR STRIP.AUTO-MCNC: NEGATIVE MG/DL
LEUKOCYTE ESTERASE UR QL STRIP.AUTO: NEGATIVE
LYMPHOCYTES # BLD AUTO: 1 X10(3) UL (ref 1–4)
LYMPHOCYTES NFR BLD AUTO: 15.3 %
M PROTEIN MFR SERPL ELPH: 7.2 G/DL (ref 6.4–8.2)
MCH RBC QN AUTO: 30.1 PG (ref 26–34)
MCHC RBC AUTO-ENTMCNC: 32.3 G/DL (ref 31–37)
MCV RBC AUTO: 93.2 FL (ref 80–100)
MONOCYTES # BLD AUTO: 0.51 X10(3) UL (ref 0.1–1)
MONOCYTES NFR BLD AUTO: 7.8 %
NEUTROPHILS # BLD AUTO: 4.76 X10 (3) UL (ref 1.5–7.7)
NEUTROPHILS # BLD AUTO: 4.76 X10(3) UL (ref 1.5–7.7)
NEUTROPHILS NFR BLD AUTO: 72.8 %
NITRITE UR QL STRIP.AUTO: NEGATIVE
OSMOLALITY SERPL CALC.SUM OF ELEC: 293 MOSM/KG (ref 275–295)
PH UR STRIP.AUTO: 6 [PH] (ref 4.5–8)
PLATELET # BLD AUTO: 277 10(3)UL (ref 150–450)
POTASSIUM SERPL-SCNC: 4 MMOL/L (ref 3.5–5.1)
PROT UR STRIP.AUTO-MCNC: NEGATIVE MG/DL
RBC # BLD AUTO: 4.29 X10(6)UL (ref 3.8–5.3)
RBC UR QL AUTO: NEGATIVE
SODIUM SERPL-SCNC: 142 MMOL/L (ref 136–145)
SP GR UR STRIP.AUTO: 1.01 (ref 1–1.03)
TSI SER-ACNC: 3 MIU/ML (ref 0.36–3.74)
UROBILINOGEN UR STRIP.AUTO-MCNC: <2 MG/DL
WBC # BLD AUTO: 6.5 X10(3) UL (ref 4–11)

## 2019-03-01 PROCEDURE — 36415 COLL VENOUS BLD VENIPUNCTURE: CPT

## 2019-03-01 PROCEDURE — 85025 COMPLETE CBC W/AUTO DIFF WBC: CPT

## 2019-03-01 PROCEDURE — 84443 ASSAY THYROID STIM HORMONE: CPT

## 2019-03-01 PROCEDURE — G0439 PPPS, SUBSEQ VISIT: HCPCS | Performed by: FAMILY MEDICINE

## 2019-03-01 PROCEDURE — 99397 PER PM REEVAL EST PAT 65+ YR: CPT | Performed by: FAMILY MEDICINE

## 2019-03-01 PROCEDURE — 80053 COMPREHEN METABOLIC PANEL: CPT

## 2019-03-01 PROCEDURE — 96160 PT-FOCUSED HLTH RISK ASSMT: CPT | Performed by: FAMILY MEDICINE

## 2019-03-01 PROCEDURE — 81003 URINALYSIS AUTO W/O SCOPE: CPT

## 2019-03-04 ENCOUNTER — TELEPHONE (OUTPATIENT)
Dept: FAMILY MEDICINE CLINIC | Facility: CLINIC | Age: 79
End: 2019-03-04

## 2019-03-04 NOTE — TELEPHONE ENCOUNTER
----- Message from Arash Oquendo MD sent at 3/4/2019  4:38 PM CST -----  Labs reviewed     Normal UA.     BS, kidney function , LFTs - normal     Thyroid testing good - continue with current dosing . CBc normal -     Recheck of labs in 1 year.

## 2019-03-19 ENCOUNTER — OFFICE VISIT (OUTPATIENT)
Dept: FAMILY MEDICINE CLINIC | Facility: CLINIC | Age: 79
End: 2019-03-19
Payer: COMMERCIAL

## 2019-03-19 ENCOUNTER — APPOINTMENT (OUTPATIENT)
Dept: LAB | Age: 79
End: 2019-03-19
Attending: NURSE PRACTITIONER
Payer: MEDICARE

## 2019-03-19 VITALS
SYSTOLIC BLOOD PRESSURE: 126 MMHG | DIASTOLIC BLOOD PRESSURE: 60 MMHG | BODY MASS INDEX: 26.22 KG/M2 | WEIGHT: 148 LBS | HEART RATE: 77 BPM | OXYGEN SATURATION: 96 % | TEMPERATURE: 99 F | HEIGHT: 63 IN

## 2019-03-19 DIAGNOSIS — E03.8 HYPOTHYROIDISM DUE TO HASHIMOTO'S THYROIDITIS: Primary | ICD-10-CM

## 2019-03-19 DIAGNOSIS — E06.3 HYPOTHYROIDISM DUE TO HASHIMOTO'S THYROIDITIS: Primary | ICD-10-CM

## 2019-03-19 LAB
T4 FREE SERPL-MCNC: 0.9 NG/DL (ref 0.8–1.7)
TSI SER-ACNC: 2.45 MIU/ML (ref 0.36–3.74)

## 2019-03-19 PROCEDURE — 99214 OFFICE O/P EST MOD 30 MIN: CPT | Performed by: NURSE PRACTITIONER

## 2019-03-19 PROCEDURE — 36415 COLL VENOUS BLD VENIPUNCTURE: CPT | Performed by: NURSE PRACTITIONER

## 2019-03-19 PROCEDURE — 84439 ASSAY OF FREE THYROXINE: CPT | Performed by: NURSE PRACTITIONER

## 2019-03-19 PROCEDURE — 84443 ASSAY THYROID STIM HORMONE: CPT | Performed by: NURSE PRACTITIONER

## 2019-03-19 NOTE — PATIENT INSTRUCTIONS
I would recommend keeping a food and drink log–and avoiding foods that cause diarrhea. We will check thyroid blood work today. Recommend that you use your albuterol inhaler first and then your Symbicort to help the Symbicort get in your lungs.     Rec

## 2019-03-20 ENCOUNTER — TELEPHONE (OUTPATIENT)
Dept: FAMILY MEDICINE CLINIC | Facility: CLINIC | Age: 79
End: 2019-03-20

## 2019-03-20 NOTE — TELEPHONE ENCOUNTER
----- Message from ABBY Grier sent at 3/20/2019 11:19 AM CDT -----  Please notify patient that her TSH and T4 are normal–her Synthroid dose is good it does not need to be adjusted.

## 2019-03-20 NOTE — TELEPHONE ENCOUNTER
Informed pt of her blood work results. Pt states she did not eat her hash browns today. Pt expressed understanding and thanks.

## 2019-05-31 ENCOUNTER — OFFICE VISIT (OUTPATIENT)
Dept: FAMILY MEDICINE CLINIC | Facility: CLINIC | Age: 79
End: 2019-05-31
Payer: COMMERCIAL

## 2019-05-31 ENCOUNTER — TELEPHONE (OUTPATIENT)
Dept: FAMILY MEDICINE CLINIC | Facility: CLINIC | Age: 79
End: 2019-05-31

## 2019-05-31 ENCOUNTER — HOSPITAL ENCOUNTER (OUTPATIENT)
Dept: GENERAL RADIOLOGY | Age: 79
Discharge: HOME | End: 2019-05-31
Attending: FAMILY MEDICINE
Payer: MEDICARE

## 2019-05-31 VITALS
OXYGEN SATURATION: 92 % | WEIGHT: 145.81 LBS | DIASTOLIC BLOOD PRESSURE: 64 MMHG | HEART RATE: 72 BPM | TEMPERATURE: 98 F | BODY MASS INDEX: 25.52 KG/M2 | HEIGHT: 63.5 IN | SYSTOLIC BLOOD PRESSURE: 138 MMHG | RESPIRATION RATE: 18 BRPM

## 2019-05-31 DIAGNOSIS — G89.29 ABDOMINAL PAIN, CHRONIC, GENERALIZED: ICD-10-CM

## 2019-05-31 DIAGNOSIS — R10.84 ABDOMINAL PAIN, CHRONIC, GENERALIZED: Primary | ICD-10-CM

## 2019-05-31 DIAGNOSIS — G89.29 ABDOMINAL PAIN, CHRONIC, GENERALIZED: Primary | ICD-10-CM

## 2019-05-31 DIAGNOSIS — R10.84 ABDOMINAL PAIN, CHRONIC, GENERALIZED: ICD-10-CM

## 2019-05-31 DIAGNOSIS — I10 BENIGN HYPERTENSION: ICD-10-CM

## 2019-05-31 PROCEDURE — 99214 OFFICE O/P EST MOD 30 MIN: CPT | Performed by: FAMILY MEDICINE

## 2019-05-31 PROCEDURE — 74019 RADEX ABDOMEN 2 VIEWS: CPT | Performed by: FAMILY MEDICINE

## 2019-05-31 RX ORDER — AMLODIPINE BESYLATE 2.5 MG/1
2.5 TABLET ORAL DAILY
Qty: 90 TABLET | Refills: 3 | Status: SHIPPED | OUTPATIENT
Start: 2019-05-31 | End: 2020-01-01

## 2019-05-31 NOTE — PATIENT INSTRUCTIONS
Obtain xray today     Decrease fats in breakfast     Ok for refills.    Recheck in 2 months for Appointmentl

## 2019-05-31 NOTE — TELEPHONE ENCOUNTER
----- Message from Tristan Valverde MD sent at 5/31/2019  4:11 PM CDT -----  Xray of bowels normal .

## 2019-06-02 NOTE — PROGRESS NOTES
Chief Complaint:   Patient presents with:  Diarrhea  Other: Tightness in chest that lasted about 30 minutes      HPI:   This is a 66year old female coming in for upset stomach   Off and on for several months.    Feels that it is related to certain foods - tablet (2.5 mg total) by mouth daily. Disp: 90 tablet Rfl: 3   Levothyroxine Sodium 25 MCG Oral Tab Take 1 tablet (25 mcg total) by mouth daily.  Disp: 90 tablet Rfl: 1   Albuterol Sulfate  (90 Base) MCG/ACT Inhalation Aero Soln INHALE 2 PUFFS FOUR T reviewed. Appears stated age, well groomed. Physical Exam:    GEN:  Patient is alert, awake and oriented, well developed, well nourished     , no apparent distress.   HEENT:  Head:  Normocephalic, atraumatic   Eyes: EOMI, PERRLA, no scleral icterus, conju

## 2019-06-14 RX ORDER — LEVOTHYROXINE SODIUM 0.03 MG/1
TABLET ORAL
Qty: 90 TABLET | Refills: 0 | Status: SHIPPED | OUTPATIENT
Start: 2019-06-14 | End: 2019-09-05

## 2019-06-14 NOTE — TELEPHONE ENCOUNTER
Future appt:     Your appointments     Date & Time Appointment Department [facility])    Jul 31, 2019  1:30 PM CDT Follow up with Ar Hampton MD [address] [place] [address] [facility],

## 2019-08-15 ENCOUNTER — OFFICE VISIT (OUTPATIENT)
Dept: FAMILY MEDICINE CLINIC | Facility: CLINIC | Age: 79
End: 2019-08-15
Payer: COMMERCIAL

## 2019-08-15 VITALS
WEIGHT: 145.63 LBS | BODY MASS INDEX: 25.49 KG/M2 | HEART RATE: 70 BPM | SYSTOLIC BLOOD PRESSURE: 132 MMHG | RESPIRATION RATE: 18 BRPM | HEIGHT: 63.5 IN | TEMPERATURE: 98 F | OXYGEN SATURATION: 93 % | DIASTOLIC BLOOD PRESSURE: 72 MMHG

## 2019-08-15 DIAGNOSIS — G89.29 ABDOMINAL PAIN, CHRONIC, GENERALIZED: Primary | ICD-10-CM

## 2019-08-15 DIAGNOSIS — R10.84 ABDOMINAL PAIN, CHRONIC, GENERALIZED: Primary | ICD-10-CM

## 2019-08-15 PROCEDURE — 99213 OFFICE O/P EST LOW 20 MIN: CPT | Performed by: FAMILY MEDICINE

## 2019-08-15 RX ORDER — LISINOPRIL 40 MG/1
TABLET ORAL
Qty: 90 TABLET | Refills: 3 | Status: SHIPPED | OUTPATIENT
Start: 2019-08-15 | End: 2020-01-01

## 2019-08-15 NOTE — TELEPHONE ENCOUNTER
Future appt:     Your appointments     Date & Time Appointment Department [facility])    Aug 15, 2019  1:30 PM CDT Follow up with Thanh Greene MD 25 [facility], [facility],

## 2019-08-16 PROBLEM — N18.30 CKD (CHRONIC KIDNEY DISEASE) STAGE 3, GFR 30-59 ML/MIN (HCC): Chronic | Status: ACTIVE | Noted: 2019-08-16

## 2019-08-17 NOTE — PROGRESS NOTES
Chief Complaint:   Patient presents with: Follow - Up: from abdominal pain appnt in may      HPI:   This is a 66year old female coming in for f/u care   Patient had been in with abdominal pain   Reviewed xray reports. abdoinal pain has resolved. 90 tablet Rfl: 3   Albuterol Sulfate  (90 Base) MCG/ACT Inhalation Aero Soln INHALE 2 PUFFS FOUR TIMES PER DAY AS NEEDED. Disp: 25.5 Inhaler Rfl: 3   SYMBICORT 160-4.5 MCG/ACT Inhalation Aerosol INHALE 2 PUFFS INTO THE LUNGS DAILY.  Disp: 10.2 Inhale nourished     , no apparent distress.   HEENT:  Head:  Normocephalic, atraumatic   Eyes: EOMI, PERRLA, no scleral icterus, conjunctivae clear bilaterally, no eye discharge   Ears: External normal. Nose: patent, no nasal discharge   Throat:  No tonsillar mandy

## 2019-09-05 NOTE — TELEPHONE ENCOUNTER
Future appt:    Last Appointment with provider:   Visit date not found  Last appointment at [facility] [place]:  8/15/2019  No results found for: CHOLEST, HDL, LDL, TRIGLY, TRIG  No results found for: EAG, A1C  Lab Results   Component Value Date    T4F 0.9 03/19

## 2019-09-09 ENCOUNTER — TELEPHONE (OUTPATIENT)
Dept: FAMILY MEDICINE CLINIC | Facility: CLINIC | Age: 79
End: 2019-09-09

## 2019-09-09 RX ORDER — LEVOTHYROXINE SODIUM 0.03 MG/1
TABLET ORAL
Qty: 90 TABLET | Refills: 0 | Status: SHIPPED | OUTPATIENT
Start: 2019-09-09 | End: 2019-01-01

## 2019-09-09 NOTE — TELEPHONE ENCOUNTER
Dr. Ez Patel is out of office today. One refill done. Please let patient know that she needs to be seen before more refills. Thank you.

## 2019-09-09 NOTE — TELEPHONE ENCOUNTER
Patient called again asking for her refill of levothyroxine to be sent to [facility] in [place]. Last TSH and T4 was 3/19/19. No future appointments. Alize can you please advise refill.      I will let patient know she is due for follow up visit prior to

## 2019-09-09 NOTE — TELEPHONE ENCOUNTER
Patient informed of the below. States she was just in to see Dr. Glenn Rosen and does not think she should be due to come back. I informed patient the recent appointments were for abdominal pain problem visits.      She is asking for Dr. Glenn Rosen to advise on when h

## 2019-09-09 NOTE — TELEPHONE ENCOUNTER
Patient needs refill on her Levothyroxine 25mcg 90 day supply to [facility] [place] 4

## 2019-09-14 ENCOUNTER — TELEPHONE (OUTPATIENT)
Dept: FAMILY MEDICINE CLINIC | Facility: CLINIC | Age: 79
End: 2019-09-14

## 2019-09-14 RX ORDER — ALBUTEROL SULFATE 90 UG/1
AEROSOL, METERED RESPIRATORY (INHALATION)
Qty: 25.5 INHALER | Refills: 3 | Status: SHIPPED | OUTPATIENT
Start: 2019-09-14 | End: 2020-01-01

## 2019-09-14 NOTE — TELEPHONE ENCOUNTER
Patient has already received both pneumonia vaccinations  - in 2016 and 2017 . She is up to date with pneumonia vaccines and does not need any further ones at this time. Patient should receive flu shot this fall.

## 2019-09-14 NOTE — TELEPHONE ENCOUNTER
Future appt:    Last Appointment with provider:   8/15/2019  Last appointment at [facility] [place]:  8/15/2019  No results found for: CHOLEST, HDL, LDL, TRIGLY, TRIG  No results found for: EAG, A1C  Lab Results   Component Value Date    T4F 0.9 03/19/2019    TS

## 2019-09-14 NOTE — TELEPHONE ENCOUNTER
Chart reviewed     [id number] Audrey Morejon for refill. Seen for health care check in March.      [id number] Audrey Morejon for physical in March 2020

## 2019-09-17 DIAGNOSIS — I10 ESSENTIAL HYPERTENSION: ICD-10-CM

## 2019-09-17 NOTE — TELEPHONE ENCOUNTER
Future appt:    Last Appointment with provider:   8/15/2019 f/u care; recheck physical in Feb, March  Last appointment at [facility] [place]:  8/15/2019  No results found for: CHOLEST, HDL, LDL, TRIGLY, TRIG  No results found for: EAG, A1C  Lab Results   Compone

## 2019-09-18 DIAGNOSIS — I10 ESSENTIAL HYPERTENSION: ICD-10-CM

## 2019-09-18 RX ORDER — HYDROCHLOROTHIAZIDE 25 MG/1
25 TABLET ORAL DAILY
Qty: 90 TABLET | Refills: 1 | Status: SHIPPED | OUTPATIENT
Start: 2019-09-18 | End: 2020-01-01

## 2019-09-20 RX ORDER — HYDROCHLOROTHIAZIDE 25 MG/1
TABLET ORAL
Qty: 90 TABLET | Refills: 3 | OUTPATIENT
Start: 2019-09-20

## 2019-09-20 NOTE — TELEPHONE ENCOUNTER
Received e-scribe refill request for hctz from [facility] in [place]. Hctz was refilled to [facility] in [place] for #90 with 1 refill on 9/18/19. Refill denied with the above notation.

## 2019-10-08 NOTE — PROGRESS NOTES
Blood pressure is fluctuating  I will monitored closely    Advised to monitor blood pressure at home also Janki Calixto is a 66year old female.   Patient presents with:  Diarrhea: x 3-4 months      HPI:   Complaints of diarrhea for 3-4 months   Is on levothyroxin 25 mcg   States she takes imodium prior to breakfast - she will have diarrhea   Eats bagel with Tobacco Use      Smoking status: Current Every Day Smoker        Packs/day: 0.50        Years: 50.00        Pack years: 25        Types: Cigarettes      Smokeless tobacco: Never Used    Alcohol use: No      Alcohol/week: 0.0 oz    Drug use: No    Family Hi in your lungs. Recommend stopping smoking.

## 2019-12-02 NOTE — TELEPHONE ENCOUNTER
Future appt:    Last Appointment with provider:   8/15/2019 f/u care; recheck Feb or March  Last appointment at [facility] [place]:  8/15/2019  No results found for: CHOLEST, HDL, LDL, TRIGLY, TRIG  No results found for: EAG, A1C  Lab Results   Component Value D

## 2019-12-16 NOTE — TELEPHONE ENCOUNTER
Future appt:    Last Appointment with provider:   8/15/2019; Recheck with physical in February or March     Last appointment at [facility] [place]:  8/15/2019  No results found for: CHOLEST, HDL, LDL, TRIGLY, TRIG  No results found for: EAG, A1C  Lab Results   C

## 2020-01-01 ENCOUNTER — TELEPHONE (OUTPATIENT)
Dept: FAMILY MEDICINE CLINIC | Facility: CLINIC | Age: 80
End: 2020-01-01

## 2020-01-01 ENCOUNTER — HOSPITAL ENCOUNTER (OUTPATIENT)
Dept: GENERAL RADIOLOGY | Age: 80
Discharge: HOME | End: 2020-01-01
Attending: FAMILY MEDICINE
Payer: MEDICARE

## 2020-01-01 ENCOUNTER — OFFICE VISIT (OUTPATIENT)
Dept: FAMILY MEDICINE CLINIC | Facility: CLINIC | Age: 80
End: 2020-01-01
Payer: COMMERCIAL

## 2020-01-01 ENCOUNTER — PATIENT OUTREACH (OUTPATIENT)
Dept: FAMILY MEDICINE CLINIC | Facility: CLINIC | Age: 80
End: 2020-01-01

## 2020-01-01 VITALS
WEIGHT: 121.63 LBS | OXYGEN SATURATION: 94 % | BODY MASS INDEX: 21.28 KG/M2 | RESPIRATION RATE: 16 BRPM | HEIGHT: 63.5 IN | SYSTOLIC BLOOD PRESSURE: 124 MMHG | TEMPERATURE: 97 F | DIASTOLIC BLOOD PRESSURE: 82 MMHG | HEART RATE: 80 BPM

## 2020-01-01 VITALS
HEIGHT: 63.5 IN | TEMPERATURE: 99 F | SYSTOLIC BLOOD PRESSURE: 142 MMHG | RESPIRATION RATE: 16 BRPM | HEART RATE: 76 BPM | OXYGEN SATURATION: 96 % | BODY MASS INDEX: 28.7 KG/M2 | WEIGHT: 164 LBS | DIASTOLIC BLOOD PRESSURE: 80 MMHG

## 2020-01-01 VITALS
SYSTOLIC BLOOD PRESSURE: 114 MMHG | BODY MASS INDEX: 25.9 KG/M2 | HEART RATE: 56 BPM | DIASTOLIC BLOOD PRESSURE: 58 MMHG | RESPIRATION RATE: 22 BRPM | TEMPERATURE: 99 F | OXYGEN SATURATION: 99 % | WEIGHT: 148 LBS | HEIGHT: 63.5 IN

## 2020-01-01 VITALS
HEIGHT: 63.5 IN | WEIGHT: 127.38 LBS | RESPIRATION RATE: 12 BRPM | BODY MASS INDEX: 22.29 KG/M2 | TEMPERATURE: 97 F | DIASTOLIC BLOOD PRESSURE: 46 MMHG | HEART RATE: 84 BPM | SYSTOLIC BLOOD PRESSURE: 106 MMHG

## 2020-01-01 VITALS
SYSTOLIC BLOOD PRESSURE: 152 MMHG | DIASTOLIC BLOOD PRESSURE: 56 MMHG | HEART RATE: 86 BPM | TEMPERATURE: 98 F | HEIGHT: 63.5 IN | BODY MASS INDEX: 24.85 KG/M2 | WEIGHT: 142 LBS | RESPIRATION RATE: 24 BRPM

## 2020-01-01 VITALS
RESPIRATION RATE: 16 BRPM | TEMPERATURE: 100 F | WEIGHT: 134 LBS | BODY MASS INDEX: 23.45 KG/M2 | OXYGEN SATURATION: 95 % | HEIGHT: 63.5 IN | SYSTOLIC BLOOD PRESSURE: 116 MMHG | HEART RATE: 78 BPM | DIASTOLIC BLOOD PRESSURE: 68 MMHG

## 2020-01-01 DIAGNOSIS — R60.9 PERIPHERAL EDEMA: ICD-10-CM

## 2020-01-01 DIAGNOSIS — N18.30 CKD (CHRONIC KIDNEY DISEASE) STAGE 3, GFR 30-59 ML/MIN (HCC): ICD-10-CM

## 2020-01-01 DIAGNOSIS — R00.2 PALPITATIONS: ICD-10-CM

## 2020-01-01 DIAGNOSIS — Z00.00 ENCOUNTER FOR ANNUAL HEALTH EXAMINATION: Primary | ICD-10-CM

## 2020-01-01 DIAGNOSIS — F17.200 TOBACCO USE DISORDER: ICD-10-CM

## 2020-01-01 DIAGNOSIS — J43.1 PANLOBULAR EMPHYSEMA (HCC): ICD-10-CM

## 2020-01-01 DIAGNOSIS — I10 ESSENTIAL HYPERTENSION: ICD-10-CM

## 2020-01-01 DIAGNOSIS — E03.8 HYPOTHYROIDISM DUE TO HASHIMOTO'S THYROIDITIS: ICD-10-CM

## 2020-01-01 DIAGNOSIS — I50.41 ACUTE COMBINED SYSTOLIC AND DIASTOLIC CONGESTIVE HEART FAILURE (HCC): Primary | ICD-10-CM

## 2020-01-01 DIAGNOSIS — I50.41 ACUTE COMBINED SYSTOLIC AND DIASTOLIC CONGESTIVE HEART FAILURE (HCC): ICD-10-CM

## 2020-01-01 DIAGNOSIS — T78.40XA ALLERGIC REACTION TO DRUG, INITIAL ENCOUNTER: Primary | ICD-10-CM

## 2020-01-01 DIAGNOSIS — R60.9 PERIPHERAL EDEMA: Primary | ICD-10-CM

## 2020-01-01 DIAGNOSIS — T78.40XD ALLERGIC REACTION TO DRUG, SUBSEQUENT ENCOUNTER: ICD-10-CM

## 2020-01-01 DIAGNOSIS — E06.3 HYPOTHYROIDISM DUE TO HASHIMOTO'S THYROIDITIS: ICD-10-CM

## 2020-01-01 LAB
ABSOLUTE BASOPHILS: 30 CELLS/UL (ref 0–200)
ABSOLUTE BASOPHILS: 42 CELLS/UL (ref 0–200)
ABSOLUTE EOSINOPHILS: 147 CELLS/UL (ref 15–500)
ABSOLUTE EOSINOPHILS: 313 CELLS/UL (ref 15–500)
ABSOLUTE LYMPHOCYTES: 1246 CELLS/UL (ref 850–3900)
ABSOLUTE LYMPHOCYTES: 915 CELLS/UL (ref 850–3900)
ABSOLUTE MONOCYTES: 525 CELLS/UL (ref 200–950)
ABSOLUTE MONOCYTES: 560 CELLS/UL (ref 200–950)
ABSOLUTE NEUTROPHILS: 4118 CELLS/UL (ref 1500–7800)
ABSOLUTE NEUTROPHILS: 5005 CELLS/UL (ref 1500–7800)
ALBUMIN/GLOBULIN RATIO: 1 (CALC) (ref 1–2.5)
ALBUMIN/GLOBULIN RATIO: 1.6 (CALC) (ref 1–2.5)
ALBUMIN: 3.4 G/DL (ref 3.6–5.1)
ALBUMIN: 4.1 G/DL (ref 3.6–5.1)
ALKALINE PHOSPHATASE: 50 U/L (ref 37–153)
ALKALINE PHOSPHATASE: 71 U/L (ref 37–153)
ALT: 7 U/L (ref 6–29)
ALT: 9 U/L (ref 6–29)
APPEARANCE: CLEAR
AST: 15 U/L (ref 10–35)
AST: 19 U/L (ref 10–35)
B TYPE NATRIURETIC$PEPTIDE (BNP): 156 PG/ML
BASOPHILS: 0.5 %
BASOPHILS: 0.6 %
BILIRUBIN, TOTAL: 0.5 MG/DL (ref 0.2–1.2)
BILIRUBIN, TOTAL: 0.6 MG/DL (ref 0.2–1.2)
BILIRUBIN: NEGATIVE
BUN/CREATININE RATIO: 15 (CALC) (ref 6–22)
BUN/CREATININE RATIO: 16 (CALC) (ref 6–22)
BUN: 18 MG/DL (ref 7–25)
BUN: 24 MG/DL (ref 7–25)
C-REACTIVE PROTEIN: 8.7 MG/L
CALCIUM: 8.8 MG/DL (ref 8.6–10.4)
CALCIUM: 9.5 MG/DL (ref 8.6–10.4)
CARBON DIOXIDE: 28 MMOL/L (ref 20–32)
CARBON DIOXIDE: 31 MMOL/L (ref 20–32)
CHLORIDE: 101 MMOL/L (ref 98–110)
CHLORIDE: 99 MMOL/L (ref 98–110)
COLOR: YELLOW
CREATININE: 1.18 MG/DL (ref 0.6–0.93)
CREATININE: 1.51 MG/DL (ref 0.6–0.93)
EGFR IF AFRICN AM: 38 ML/MIN/1.73M2
EGFR IF AFRICN AM: 51 ML/MIN/1.73M2
EGFR IF NONAFRICN AM: 33 ML/MIN/1.73M2
EGFR IF NONAFRICN AM: 44 ML/MIN/1.73M2
EOSINOPHILS: 2.1 %
EOSINOPHILS: 5.3 %
GLOBULIN: 2.5 G/DL (CALC) (ref 1.9–3.7)
GLOBULIN: 3.4 G/DL (CALC) (ref 1.9–3.7)
GLUCOSE: 76 MG/DL (ref 65–99)
GLUCOSE: 83 MG/DL (ref 65–139)
GLUCOSE: NEGATIVE
HEMATOCRIT: 34 % (ref 35–45)
HEMATOCRIT: 40.5 % (ref 35–45)
HEMOGLOBIN: 11 G/DL (ref 11.7–15.5)
HEMOGLOBIN: 13.4 G/DL (ref 11.7–15.5)
KETONES: NEGATIVE
LYMPHOCYTES: 15.5 %
LYMPHOCYTES: 17.8 %
MCH: 28.2 PG (ref 27–33)
MCH: 29.6 PG (ref 27–33)
MCHC: 32.4 G/DL (ref 32–36)
MCHC: 33.1 G/DL (ref 32–36)
MCV: 87.2 FL (ref 80–100)
MCV: 89.6 FL (ref 80–100)
MONOCYTES: 8 %
MONOCYTES: 8.9 %
MPV: 10.1 FL (ref 7.5–12.5)
MPV: 9.4 FL (ref 7.5–12.5)
NEUTROPHILS: 69.8 %
NEUTROPHILS: 71.5 %
NITRITE: NEGATIVE
OCCULT BLOOD: NEGATIVE
PH: 7.5 (ref 5–8)
PLATELET COUNT: 293 THOUSAND/UL (ref 140–400)
PLATELET COUNT: 301 THOUSAND/UL (ref 140–400)
POTASSIUM: 4.4 MMOL/L (ref 3.5–5.3)
POTASSIUM: 4.5 MMOL/L (ref 3.5–5.3)
PROTEIN, TOTAL: 6.6 G/DL (ref 6.1–8.1)
PROTEIN, TOTAL: 6.8 G/DL (ref 6.1–8.1)
PROTEIN: NEGATIVE
RDW: 12.6 % (ref 11–15)
RDW: 14.2 % (ref 11–15)
RED BLOOD CELL COUNT: 3.9 MILLION/UL (ref 3.8–5.1)
RED BLOOD CELL COUNT: 4.52 MILLION/UL (ref 3.8–5.1)
SODIUM: 134 MMOL/L (ref 135–146)
SODIUM: 137 MMOL/L (ref 135–146)
SPECIFIC GRAVITY: 1.01 (ref 1–1.03)
TSH W/REFLEX TO FT4: 3.5 MIU/L (ref 0.4–4.5)
WHITE BLOOD CELL COUNT: 5.9 THOUSAND/UL (ref 3.8–10.8)
WHITE BLOOD CELL COUNT: 7 THOUSAND/UL (ref 3.8–10.8)

## 2020-01-01 PROCEDURE — 96160 PT-FOCUSED HLTH RISK ASSMT: CPT | Performed by: FAMILY MEDICINE

## 2020-01-01 PROCEDURE — 99214 OFFICE O/P EST MOD 30 MIN: CPT | Performed by: FAMILY MEDICINE

## 2020-01-01 PROCEDURE — 71046 X-RAY EXAM CHEST 2 VIEWS: CPT | Performed by: FAMILY MEDICINE

## 2020-01-01 PROCEDURE — 3008F BODY MASS INDEX DOCD: CPT | Performed by: FAMILY MEDICINE

## 2020-01-01 PROCEDURE — 3074F SYST BP LT 130 MM HG: CPT | Performed by: FAMILY MEDICINE

## 2020-01-01 PROCEDURE — 3078F DIAST BP <80 MM HG: CPT | Performed by: FAMILY MEDICINE

## 2020-01-01 PROCEDURE — 80053 COMPREHEN METABOLIC PANEL: CPT | Performed by: FAMILY MEDICINE

## 2020-01-01 PROCEDURE — 99397 PER PM REEVAL EST PAT 65+ YR: CPT | Performed by: FAMILY MEDICINE

## 2020-01-01 PROCEDURE — 99215 OFFICE O/P EST HI 40 MIN: CPT | Performed by: FAMILY MEDICINE

## 2020-01-01 PROCEDURE — 93000 ELECTROCARDIOGRAM COMPLETE: CPT | Performed by: FAMILY MEDICINE

## 2020-01-01 PROCEDURE — 3079F DIAST BP 80-89 MM HG: CPT | Performed by: FAMILY MEDICINE

## 2020-01-01 PROCEDURE — G0439 PPPS, SUBSEQ VISIT: HCPCS | Performed by: FAMILY MEDICINE

## 2020-01-01 RX ORDER — LEVOTHYROXINE SODIUM 0.03 MG/1
25 TABLET ORAL DAILY
Qty: 90 TABLET | Refills: 1 | Status: SHIPPED | OUTPATIENT
Start: 2020-01-01

## 2020-01-01 RX ORDER — SPIRONOLACTONE 25 MG/1
25 TABLET ORAL DAILY
Qty: 90 TABLET | Refills: 3 | Status: SHIPPED | OUTPATIENT
Start: 2020-01-01 | End: 2020-01-01

## 2020-01-01 RX ORDER — BUDESONIDE AND FORMOTEROL FUMARATE DIHYDRATE 160; 4.5 UG/1; UG/1
AEROSOL RESPIRATORY (INHALATION)
Qty: 20.4 INHALER | Refills: 1 | Status: SHIPPED | OUTPATIENT
Start: 2020-01-01 | End: 2020-01-01

## 2020-01-01 RX ORDER — FUROSEMIDE 20 MG/1
TABLET ORAL
Qty: 60 TABLET | Refills: 1 | Status: SHIPPED | OUTPATIENT
Start: 2020-01-01 | End: 2020-01-01

## 2020-01-01 RX ORDER — ALBUTEROL SULFATE 90 UG/1
AEROSOL, METERED RESPIRATORY (INHALATION)
Qty: 6.7 INHALER | Refills: 3 | Status: SHIPPED | OUTPATIENT
Start: 2020-01-01 | End: 2020-01-01

## 2020-01-01 RX ORDER — PREDNISONE 10 MG/1
TABLET ORAL
Qty: 30 TABLET | Refills: 0 | Status: SHIPPED | OUTPATIENT
Start: 2020-01-01 | End: 2020-01-01

## 2020-01-01 RX ORDER — ALBUTEROL SULFATE 90 UG/1
2 AEROSOL, METERED RESPIRATORY (INHALATION) 4 TIMES DAILY PRN
Qty: 1 INHALER | Refills: 0 | Status: SHIPPED | OUTPATIENT
Start: 2020-01-01 | End: 2020-01-01

## 2020-01-01 RX ORDER — HYDROCHLOROTHIAZIDE 25 MG/1
TABLET ORAL
Qty: 90 TABLET | Refills: 1 | Status: SHIPPED | OUTPATIENT
Start: 2020-01-01 | End: 2020-01-01

## 2020-01-01 RX ORDER — ALBUTEROL SULFATE 90 UG/1
AEROSOL, METERED RESPIRATORY (INHALATION)
Qty: 3 INHALER | Refills: 1 | Status: SHIPPED | OUTPATIENT
Start: 2020-01-01 | End: 2020-01-01

## 2020-01-01 RX ORDER — LEVOTHYROXINE SODIUM 0.03 MG/1
TABLET ORAL
Qty: 90 TABLET | Refills: 1 | Status: SHIPPED | OUTPATIENT
Start: 2020-01-01 | End: 2020-01-01

## 2020-01-01 RX ORDER — ALBUTEROL SULFATE 90 UG/1
AEROSOL, METERED RESPIRATORY (INHALATION)
Qty: 20.1 INHALER | Refills: 1 | OUTPATIENT
Start: 2020-01-01

## 2020-01-01 RX ORDER — FUROSEMIDE 20 MG/1
20 TABLET ORAL 2 TIMES DAILY
Qty: 60 TABLET | Refills: 1 | Status: SHIPPED | OUTPATIENT
Start: 2020-01-01 | End: 2020-01-01

## 2020-01-01 RX ORDER — ALBUTEROL SULFATE 90 UG/1
AEROSOL, METERED RESPIRATORY (INHALATION)
Qty: 20.1 INHALER | Refills: 1 | Status: SHIPPED | OUTPATIENT
Start: 2020-01-01

## 2020-01-01 RX ORDER — AMLODIPINE BESYLATE 2.5 MG/1
TABLET ORAL
Qty: 90 TABLET | Refills: 3 | Status: SHIPPED | OUTPATIENT
Start: 2020-01-01 | End: 2020-01-01

## 2020-01-01 RX ORDER — CEFDINIR 300 MG/1
300 CAPSULE ORAL DAILY
COMMUNITY
Start: 2020-01-01 | End: 2020-01-01

## 2020-01-01 RX ORDER — ACETAMINOPHEN 325 MG/1
650 TABLET ORAL
COMMUNITY
Start: 2020-01-01

## 2020-01-01 RX ORDER — LISINOPRIL 40 MG/1
TABLET ORAL
Qty: 90 TABLET | Refills: 3 | Status: SHIPPED | OUTPATIENT
Start: 2020-01-01 | End: 2020-01-01

## 2020-02-10 NOTE — PROGRESS NOTES
Chief Complaint:   Patient presents with:  Wellness Visit: MA Supervisit      HPI:   This is a 78year old female coming in for health care check     Feeling well     Medications reviewed     No complaints. Due for labs.      Taking medications regularl LISINOPRIL 40 MG Oral Tab TAKE 1 TABLET BY MOUTH EVERY DAY 90 tablet 3   • amLODIPine Besylate 2.5 MG Oral Tab Take 1 tablet (2.5 mg total) by mouth daily.  90 tablet 3   • Calcium Carbonate-Vitamin D (CALCIUM 500/VITAMIN D) 500-125 MG-UNIT Oral Tab Take 1 erythema or exudate. Mouth:  No oral lesions or ulcerations, good dentition. NECK: Supple,  no JVD, no thyromegaly. SKIN: No rashes, no skin lesion, no bruising, good turgor.     HEART:  Regular rate and rhythm, no murmurs,     LUNGS: scattered wheez

## 2020-02-13 NOTE — TELEPHONE ENCOUNTER
----- Message from ABBY Camargo sent at 2/13/2020  1:11 PM CST -----  Dr. Gene Guthrie is out of the office today. Please let patient know that her labs are basically stable. She does have a slight decrease in her renal function.   Make sure she i

## 2020-03-13 NOTE — TELEPHONE ENCOUNTER
Future appt:     Your appointments     Date & Time Appointment Department [facility])    Aug 10, 2020  9:00 AM CDT Follow Up Visit with Jessenia Turcios MD 25 [facility], Marcelina Duran ([facility] [place])            [address] [place] [address]

## 2020-03-17 NOTE — TELEPHONE ENCOUNTER
Received fax from [phone] [employer] that generic symbicort is not covered under patient's benefit plan. This has been addressed in the past and brand name symbicort was sent on 12/16/19.      I contacted patient to see if she was having any trouble picking up he

## 2020-04-27 NOTE — TELEPHONE ENCOUNTER
Patient states that Dr. Dudley Marroquin had prescribed her hctz for the swelling in her feet and ankles in the past. States this made the swelling go away so she stopped taking it. Patient states he feet and ankles are swollen again.  Patient is asking for Dr. Dudley Marroquin to s

## 2020-04-28 NOTE — TELEPHONE ENCOUNTER
Patient wants to know if she can be seen this week or within the next week or so, says her feet are very swollen    Been going on for the last 3-4 days, hard to put shoes on.  States that they don't really hurt but they tingle     Also says that she needs n

## 2020-04-28 NOTE — TELEPHONE ENCOUNTER
See phone note from yesterday. Patient was informed the hctz prescription has diuretic uses and [facility] [place] [facility]. I called patient today to clarify her concerns below.  Patient stated her feet are swollen and she needs a justice

## 2020-04-29 NOTE — TELEPHONE ENCOUNTER
Future appt:     Your appointments     Date & Time Appointment Department [facility])    Aug 10, 2020  9:00 AM CDT Follow Up Visit with Sharath Person MD 25 [facility], [facility] ([facility])            [address] [place] [address]

## 2020-04-29 NOTE — TELEPHONE ENCOUNTER
Call from ED. Her GFR is now 37 and creatinine is 1.46. She told them she was on lasix previously. She is slightly edematous 1+ pitting edema. No tenderness and not above the feet. Has a small wound on left shin from falling. And healing well.    Pravin Cotton

## 2020-04-30 NOTE — TELEPHONE ENCOUNTER
Please see phone notes starting on 4/27/20 and continuing until now. Updated CE records from ED visit at [facility] yesterday. Please advise on in office appointment.

## 2020-04-30 NOTE — TELEPHONE ENCOUNTER
Chart reviewed     Recommend patient stop amlodipine - this may be causing swelling     Recommend appointment in office

## 2020-04-30 NOTE — TELEPHONE ENCOUNTER
Appointment made.      Future Appointments   Date Time Provider Edel Glover   5/4/2020  9:00 Pako Mckenzie MD [facility] [place]   8/10/2020  9:00 AM Amador Alvarado MD [facility] [place]

## 2020-04-30 NOTE — TELEPHONE ENCOUNTER
Patient would like to schedule an appt with Dr. Javi Bynum, said she was in the emergency room yesterday for her foot    Says that she can hardly get her shoes on, she can walk though and when asked if she has any pain she said not really     Offered the patient

## 2020-05-04 NOTE — PROGRESS NOTES
Chief Complaint:   Patient presents with:  ER F/U      HPI:   This is a 78year old female coming in for post ER follow up. Patient with swelling in legs. Patient had fallen and scraped left leg. States discomfort is more in right knee.  Discomfort is i Oral Tab Take 1 tablet (20 mg total) by mouth 2 (two) times daily.  60 tablet 1   • HYDROCHLOROTHIAZIDE 25 MG Oral Tab TAKE 1 TABLET BY MOUTH EVERY DAY 90 tablet 1   • LEVOTHYROXINE SODIUM 25 MCG Oral Tab TAKE 1 TABLET BY MOUTH EVERY DAY 90 tablet 1   • SYM encounter: 142 lb (64.4 kg). Vital signs reviewed. Appears stated age, well groomed. Physical Exam:    GEN:  Patient is alert, awake and oriented, well developed, well nourished  Female    , no apparent distress.     HEENT:  Head:  Normocephalic, atraum barriers to learning. Medical education done. Outcome: Patient verbalizes understanding. Patient is notified to call with any questions, complications, allergies, or worsening or changing symptoms.   Patient is to call with any side effects or complicatio

## 2020-05-04 NOTE — TELEPHONE ENCOUNTER
Patient is needing medication refill on inhalers     ALBUTEROL     [facility] to [facility] in [place]     Would like a call back

## 2020-05-04 NOTE — TELEPHONE ENCOUNTER
Future appt:     Your appointments     Date & Time Appointment Department [facility])    Aug 10, 2020  9:00 AM CDT Follow Up Visit with Arnie Alfaro MD 25 [facility], [facility] ([place])            [address] [place] [address]

## 2020-05-04 NOTE — TELEPHONE ENCOUNTER
Future appt:     Your appointments     Date & Time Appointment Department [facility])    Jun 02, 2020  9:45 AM CDT Follow Up Visit with Pmaella Michel MD [address] [place] [address], [place]  ([facility])        Aug 10, 2020  9:00

## 2020-05-26 NOTE — TELEPHONE ENCOUNTER
Future appt:     Your appointments     Date & Time Appointment Department [facility])    Jun 08, 2020 10:00 AM CDT Follow Up Visit with Billy Wetzel MD [address] [place] [address], Courtney Martinez ([facility])        Aug 10, 2020  9:00

## 2020-06-08 NOTE — PROGRESS NOTES
Chief Complaint:   Patient presents with: Follow - Up: follow up swelling of legs and feet       HPI:   This is a 78year old female coming in for acute illness with ongoing leg swelling.  Some dyspnea, but not more than usual  Long history of smoking Result Value Ref Range    TSH W/REFLEX TO FT4 4.30 0.40 - 4.50 mIU/L   BNP (B TYPE NATRIUERTIC PEPTIDE)   Result Value Ref Range    B TYPE NATRIURETIC$PEPTIDE (BNP) 297 (H) <100 pg/mL   C-REACTIVE PROTEIN   Result Value Ref Range    C-REACTIVE PROTEIN 9. FOUR TIMES PER DAY AS NEEDED.  3 Inhaler 1   • HYDROCHLOROTHIAZIDE 25 MG Oral Tab TAKE 1 TABLET BY MOUTH EVERY DAY 90 tablet 1   • LEVOTHYROXINE SODIUM 25 MCG Oral Tab TAKE 1 TABLET BY MOUTH EVERY DAY 90 tablet 1   • SYMBICORT 160-4.5 MCG/ACT Inhalation Aer distress.   No respiratory distress    HEENT:  Head:  Normocephalic, atraumatic   Eyes: EOMI, PERRLA, no scleral icterus, conjunctivae clear bilaterally, no eye discharge   Ears: External normal. Nose: patent, no nasal discharge    NECK: Supple,  no JVD, no effects or complications from the treatments as a result of today.      Problem List:  Patient Active Problem List:     [place] lesion     Panlobular emphysema ([facility] [address]     Essential hypertension     Hypoxemia     Obesity     Tobacco use disorder     CKD (chronic

## 2020-06-08 NOTE — TELEPHONE ENCOUNTER
[facility] [place] [facility]. One attempt I was on hold for awhile and then the line was picked up with no one there. Called back and line was picked up with no one there and then disconnected.

## 2020-06-09 PROBLEM — R60.9 PERIPHERAL EDEMA: Status: ACTIVE | Noted: 2020-01-01

## 2020-06-09 PROBLEM — I50.41 ACUTE COMBINED SYSTOLIC AND DIASTOLIC CONGESTIVE HEART FAILURE (HCC): Status: ACTIVE | Noted: 2020-01-01

## 2020-06-10 NOTE — TELEPHONE ENCOUNTER
kristie, from [facility] pharmacy, wants to know if the rx for spironolactone is supposed to replace pt's furosemide and hctz

## 2020-06-10 NOTE — TELEPHONE ENCOUNTER
Sanket  at [facility] in [place] informed of the below and d/c hctz from patient's chart with them. I also d/c the hctz from our chart for patient.

## 2020-06-17 NOTE — TELEPHONE ENCOUNTER
Requesting refill for pro air sent to [facility]

## 2020-06-17 NOTE — TELEPHONE ENCOUNTER
Reviewed refill request. 3 inhalers is a 33.75 day supply for patient. Please advise refill. Future appt:     Your appointments     Date & Time Appointment Department [facility])    Jun 27, 2020 10:00 AM CDT Follow Up Visit with Thanh Greene MD Ed

## 2020-06-17 NOTE — TELEPHONE ENCOUNTER
Dr. Avinash Corral is out of the office today. Given only one refill since one should last for close to a year.

## 2020-06-19 NOTE — TELEPHONE ENCOUNTER
Future appt:     Your appointments     Date & Time Appointment Department [facility])    Jun 27, 2020 10:00 AM CDT Follow Up Visit with Kevin Sahu MD [facility] ([facility])        Aug 10, 2020  9:00

## 2020-06-26 NOTE — TELEPHONE ENCOUNTER
Future appt:     Your appointments     Date & Time Appointment Department [facility])    Jun 27, 2020 10:00 AM CDT Follow Up Visit with Lay Daly MD [address] [place] [address] [place] ([facility])        Aug 10, 2020  9:00

## 2020-06-28 NOTE — PROGRESS NOTES
Chief Complaint:   Patient presents with:   Follow - Up: f/u swollen legs and feet      HPI:   This is a 78year old female coming in for f/u care   See prior note - peripheral edema with clinical CHF  -- elevated BNP     Started on spironolactone and amlod FREE T4   Result Value Ref Range    TSH W/REFLEX TO FT4 4.30 0.40 - 4.50 mIU/L   BNP (B TYPE NATRIUERTIC PEPTIDE)   Result Value Ref Range    B TYPE NATRIURETIC$PEPTIDE (BNP) 297 (H) <100 pg/mL   C-REACTIVE PROTEIN   Result Value Ref Range    C-REACTIVE MA Inhalation Aero Soln Inhale 2 puffs into the lungs 4 (four) times daily as needed. 1 Inhaler 0   • spironolactone 25 MG Oral Tab Take 1 tablet (25 mg total) by mouth daily.  90 tablet 3   • LEVOTHYROXINE SODIUM 25 MCG Oral Tab TAKE 1 TABLET BY MOUTH EVERY D developed, well nourished  Female    , no apparent distress.   No respiratory distress     HEENT:  Head:  Normocephalic, atraumatic   Eyes: EOMI, PERRLA, no scleral icterus, conjunctivae clear bilaterally, no eye discharge   Ears: External normal. Nose: pat List:     [place] lesion     Panlobular emphysema ([facility] [address]     Essential hypertension     Hypoxemia     Obesity     Tobacco use disorder     CKD (chronic kidney disease) stage 3, GFR 30-59 ml/min ([facility])     Peripheral edema     Acute combined systolic and diastol

## 2020-07-08 NOTE — TELEPHONE ENCOUNTER
Kirti Earing states that pt tried calling the office for a refill on her Albuterol but has not heard back, states that pt needs refill today if possible since pt is completely out. Please send to [facility] pharmacy [place].

## 2020-07-08 NOTE — TELEPHONE ENCOUNTER
Patient states she needs this refilled today. Future appt: Your appointments     Date & Time Appointment Department [facility])    Aug 10, 2020  9:00 AM CDT Follow Up Visit with Javier Bennett MD 25 [facility])            [address] [place] [address], [address] [place] [address] [facility] [place]  [address] [id number]  [phone]        Last Appointment with provider:   6/27/2020  Last appointment at [facility] [place]:  6/27/2020  No results found for: CHOLEST, HDL, LDL, TRIGLY, TRIG  No results found for: EAG, A1C  Lab Results   Component Value Date    T4F 0.9 03/19/2019    TSH 2.450 03/19/2019    TSHT4 4.30 06/08/2020       No follow-ups on file.

## 2020-07-08 NOTE — TELEPHONE ENCOUNTER
Patient states that she needs a refill on her pro air to [facility] [place].

## 2020-07-09 NOTE — TELEPHONE ENCOUNTER
Script was sent yesterday and shows that pharmacy confirmed receipt. Will call pharmacy to verify. Called pharmacy and [facility] pharmacy tech states patient is actually in the store picking up her prescription.

## 2020-07-09 NOTE — TELEPHONE ENCOUNTER
Patient called in for the 3rd time, wanting to know if her inhaler refill was sent to her pharmacy yet?  States she called them already and they still do not have it

## 2020-07-17 PROBLEM — T78.40XA ALLERGIC DRUG REACTION: Status: ACTIVE | Noted: 2020-01-01

## 2020-07-17 NOTE — PROGRESS NOTES
Chief Complaint:   Patient presents with:  Rash      HPI:   This is a 78year old female coming in for acute illness with rash developing on body including - chest, abdomen, legs and back     Has otherwise felt better regards to breathing and swelling. U/L    ALT 7 6 - 29 U/L   TSH W REFLEX TO FREE T4   Result Value Ref Range    TSH W/REFLEX TO FT4 4.30 0.40 - 4.50 mIU/L   BNP (B TYPE NATRIUERTIC PEPTIDE)   Result Value Ref Range    B TYPE NATRIURETIC$PEPTIDE (BNP) 297 (H) <100 pg/mL   C-REACTIVE PROTEIN Soln INHALE 2 PUFFS INTO THE LUNGS 4 TIMES DAILY AS NEEDED 6.7 Inhaler 3   • SYMBICORT 160-4.5 MCG/ACT Inhalation Aerosol INHALE 2 PUFFS INTO THE LUNGS EVERY DAY 20.4 Inhaler 1   • FUROSEMIDE 20 MG Oral Tab TAKE 1 TABLET BY MOUTH TWICE A DAY 60 tablet 1 6.4 oz (57.8 kg). Vital signs reviewed. Appears stated age, well groomed. Physical Exam:    GEN:  Patient is alert, awake and oriented, well developed, well nourished  Female    , no apparent distress.   HEENT:  Head:  Normocephalic, atraumatic     Eyes emphysema ([facility]     Essential hypertension     Hypoxemia     Obesity     Tobacco use disorder     CKD (chronic kidney disease) stage 3, GFR 30-59 ml/min ([facility])     Peripheral edema     Acute combined systolic and diastolic congestive heart failure ([facility]

## 2020-07-17 NOTE — TELEPHONE ENCOUNTER
Future appt:     Your appointments     Date & Time Appointment Department [facility])    Aug 10, 2020  9:00 AM CDT Follow Up Visit with Amarilys Yang MD [address] [place], [place] [address] [facility]

## 2020-07-29 NOTE — TELEPHONE ENCOUNTER
Future appt:     Your appointments     Date & Time Appointment Department [facility])    Aug 10, 2020  9:00 AM CDT Follow Up Visit with Stephanie Quinones MD 25 [facility], [facility] ([facility])            [address] [facility]

## 2020-08-01 NOTE — TELEPHONE ENCOUNTER
Future appt:     Your appointments     Date & Time Appointment Department [facility])    Aug 10, 2020  9:00 AM CDT Follow Up Visit with Ar Hampton MD [address] [place] [address], [facility] ([place])            [facility]

## 2020-08-07 NOTE — TELEPHONE ENCOUNTER
Patient is scheduled for med check/follow up with TR on 8/10. Patient answered yes to cough and SOB on travel screen. States this is not new, it is a result of smoking for years and years.      Please advise if appointment can be kept or any special pre

## 2020-08-07 NOTE — TELEPHONE ENCOUNTER
answered YES to 2 COVID questions       1) cough   2 ) SOB      ( says due to smoking for yrs )      please triage      Future Appointments   Date Time Provider Edel Glover   8/10/2020  9:00 AM Jael Reynolds MD [facility]

## 2020-08-07 NOTE — TELEPHONE ENCOUNTER
Patient informed of the below. Patient was confused during the entire conversation. Patient did not remember doing travel screen and had no idea that her appointment on Monday needed provider approval based on her travel screen answers today.      Informed

## 2020-08-10 NOTE — PATIENT INSTRUCTIONS
Good exam     Stop furosemide. Keep log of blood pressure -twice a week    Labs look improved. Recheck in November.

## 2020-08-12 PROBLEM — E66.9 OBESITY: Status: RESOLVED | Noted: 2017-02-06 | Resolved: 2020-01-01

## 2020-08-12 PROBLEM — T78.40XA ALLERGIC DRUG REACTION: Status: RESOLVED | Noted: 2020-01-01 | Resolved: 2020-01-01

## 2020-08-12 NOTE — PROGRESS NOTES
Chief Complaint:   Patient presents with:  Medication Follow-Up      HPI:   This is a 78year old female coming in for f/u care. Patient with CHF in June.   Patient does not want to go to cardiology  - was placed on  Spironolactone , with good results wit A DAY 60 tablet 1   • predniSONE 10 MG Oral Tab Two tab bid for 3 days, One tab tid for 3 days, One tab bid for 3 days, One tab qd for 3 days.  30 tablet 0   • ALBUTEROL SULFATE  (90 Base) MCG/ACT Inhalation Aero Soln INHALE 2 PUFFS INTO THE LUNGS 4 reviewed. Appears stated age, well groomed. Physical Exam:    GEN:  Patient is alert, awake and oriented, well developed, well nourished  Female     , no apparent distress.     HEENT:  Head:  Normocephalic, atraumatic   Eyes: EOMI, PERRLA, no scleral icte Patient is to call with any side effects or complications from the treatments as a result of today.      Problem List:  Patient Active Problem List:     [place] lesion     Panlobular emphysema ([address]     Essential hypertension     Hypoxemia     Obesity     [facility]

## 2020-08-27 NOTE — TELEPHONE ENCOUNTER
Future appt:     Your appointments     Date & Time Appointment Department [facility])    Nov 09, 2020  9:00 AM CST Follow Up Visit with Stephanie Quinones MD 25 [facility], [facility])            [facility]

## 2020-08-31 NOTE — TELEPHONE ENCOUNTER
Future appt:     Your appointments     Date & Time Appointment Department [facility])    Nov 09, 2020  9:00 AM CST Follow Up Visit with Natasha Umaña MD [address] [place] [address], [facility] ([place])            [facility]

## 2020-09-04 NOTE — TELEPHONE ENCOUNTER
Call was transferred to me from  to triage SOB and bloating. When I first got on the phone with patient and asked her what was going on she asked \"when can I get in? \"  I asked her what was going on again and she replied that she feels bloated.

## 2020-09-05 NOTE — TELEPHONE ENCOUNTER
Patient informed of the below and v/u. Patient scheduled for appointment with Dr. Hunter Salinas.      Future Appointments   Date Time Provider [facility]   9/15/2020  2:00 PM Claire Nikc MD [facility] [place]   11/9/2020  9:00 AM Claire Nick,

## 2020-09-05 NOTE — TELEPHONE ENCOUNTER
Chart reviewed     Recommend restart of furosemide twice daily     Recommend upcoming appointment;  Go to ER if symptoms worsen or difficulty with breathing

## 2020-09-10 NOTE — TELEPHONE ENCOUNTER
Patient just discharged from [facility])  She is concerned because there are only 3 new medications on her discharge papers and none of her regular medications. Asking what medicine she should take?   Has appointment for post hospital check

## 2020-09-10 NOTE — TELEPHONE ENCOUNTER
Called and spoke with patient. Was discharged from [facility] on 09/09/20. Was in room 1022, floor Y1.      Discharge medication list includes:  Cefdinir  Acetaminophen  Albuterol  Levothyroxine    Notes reviewed, no note of continuing symbicort, note to contin

## 2020-09-15 NOTE — TELEPHONE ENCOUNTER
Patient was on schedule to see Dr. Afshin Colón today at 2 pm.     Appointment was cancelled with reason of patient hospitalized.

## 2020-09-17 NOTE — TELEPHONE ENCOUNTER
patient received a call from office about some medicine, please give her a call back  Future Appointments   Date Time Provider Edel Glover   9/23/2020  1:20 PM Johanny Diaz MD [facility] [place] [place]   11/9/2020  9:00 AM Jessenia Turcios MD [facility]

## 2020-09-17 NOTE — TELEPHONE ENCOUNTER
Spoke with patient and she was re-admitted. She is up to date on her medications now. Has post hospital check with oncology next week.

## 2020-09-22 NOTE — TELEPHONE ENCOUNTER
pt answered YES to 2 COVID travel questions    1) SOB  - due COPD    2)   WEAKNESS     due to being in hospital  ( has post hosp appt 9/23 @ 120pm )     Future Appointments   Date Time Provider Edel Glover   9/23/2020  1:20 PM Wiliam Summers MD [facility]

## 2020-09-24 NOTE — TELEPHONE ENCOUNTER
Spoke with patient. States he is having SOB that has been getting worse throughout the day. Patient also c/o being weak. States she can walk but not very far. Patient denies having any chest pain.   Patient states she was recently admitted to [facility] for [place]

## 2020-11-03 NOTE — TELEPHONE ENCOUNTER
Returned call to Erik Basurto at [facility]. She states that patient passed quickly and peacefully today around 1 pm.     Death certificate is being faxed to Dr. Singh Mata to sign. Erik Basurto wanted me to call her back to ensure Dr. Singh Mata received the message.

## 2020-11-04 ENCOUNTER — TELEPHONE (OUTPATIENT)
Dept: FAMILY MEDICINE CLINIC | Facility: CLINIC | Age: 80
End: 2020-11-04

## 2020-11-04 NOTE — TELEPHONE ENCOUNTER
Reviewed patient's verbal release and there is no \"[place]ed[place]\" listed. Reception did not take down information as to who Rey Srinivasan is in relation to patient or where she is calling from.      Called Rey Srinivasan back and was sent to her personalized voicemail st

## 2022-08-24 NOTE — TELEPHONE ENCOUNTER
Chart reviewed - patient is due for physical / health care check ( last done June 2016)
OK to close encounter. Rx called into pharmacy. Pneumonia shot given 10/27/17. Physical done 2/7/18 with Dr Amanda Turk.
Pt last seen in Feb for pre-op exam.    Pt is needing refill of Symbicort-    Prescription states for pt to take 2 puffs daily,     Pt states she takes one puff BID?'  Please advise,  Pt will need RX to [facility].     Pt also states she is \"probably\" due for
was told by pharmacy to call pcp to request rx for symbicort- also wants to know if she needs to get a pneumonia shot
Right Middle finger Fracture

## 2022-12-19 NOTE — TELEPHONE ENCOUNTER
Future appt:     Your appointments     Date & Time Appointment Department [facility])    Aug 10, 2020  9:00 AM CDT Follow Up Visit with Santhosh Malave MD [address], Stevphen [place] ([facility])            [address] [place] [address] hard copy, drawn during this pregnancy

## (undated) NOTE — MR AVS SNAPSHOT
[address] [place]  [address] [id number]  [phone]               Thank you for choosing us for your health care visit with Remedios Fraser MD.  We are glad to serve you and happy to provide you with this summary of Generic drug:  Budesonide-Formoterol Fumarate   Inhale 2 puffs into the lungs daily.                    Today's Orders     EKG with interpretation and Report -IN OFFICE [[id number]]    Complete by:  As directed    Assoc Dx:  Cortical age-related cataract of both ? Keep a working flashlight near your bed. STAIRS:  ? Keep stairwells well lit with light switches at top and bottom. ? Install sturdy handrails on both sides. ? Make sure carpeting is secure. FLOORS:  ? Remove all loose wires, cords and throw rugs.   ? Start activities slowly and build up over time Do what you like   Get your heart pumping – brisk walking, biking, swimming Even 10 minute increments are effective and add up over the week   2 ½ hours per week – spread out over time Use a damian to keep you